# Patient Record
Sex: MALE | Race: WHITE | NOT HISPANIC OR LATINO | URBAN - METROPOLITAN AREA
[De-identification: names, ages, dates, MRNs, and addresses within clinical notes are randomized per-mention and may not be internally consistent; named-entity substitution may affect disease eponyms.]

---

## 2023-11-30 NOTE — PROGRESS NOTES
"Counseling:  The patient was counseled regarding diagnostic results, instructions for management, risk factor reductions, prognosis, patient and family education, impressions, risks and benefits of treatment options and importance of compliance with treatment.      Chief Complaint:   The patient presents today for 6-month followup of CAD, HTN and hyperlipidemia.       History Of Present Illness:    Gulshan Martinez is a 48 year old male patient who presents today for 6-month followup of CAD, HTN and hyperlipidemia. His PMH is significant for CAD with h/o STEMI s/p POBA to D2 and LOLI to mid LAD 06/24/2021, HTN, hyperlipidemia and tobacco abuse. Over the past 6 months, the patient states that he has done well from a cardiac standpoint. He denies any CP, chest discomfort or SOB. BP has been stable at home. The patient is compliant with his prescribed medications.       Last Recorded Vitals:  Vitals:    12/04/23 1220   BP: 142/88   Pulse: 90   Weight: 147 kg (323 lb 9.6 oz)   Height: 1.803 m (5' 11\")       Past Surgical History:  He has a past surgical history that includes Other surgical history (06/25/2021).      Social History:  He reports that he has been smoking cigarettes. He has a 30.00 pack-year smoking history. He has never used smokeless tobacco. He reports current alcohol use of about 2.0 standard drinks of alcohol per week. He reports that he does not use drugs.    Family History:  No family history on file.     Allergies:  Patient has no known allergies.    Outpatient Medications:  Current Outpatient Medications   Medication Instructions    aspirin 81 mg, oral, Daily    clopidogrel (PLAVIX) 75 mg, oral, Daily    lisinopril 20 mg, oral, Daily    metoprolol tartrate (LOPRESSOR) 25 mg, oral, 2 times daily    rosuvastatin (CRESTOR) 40 mg, oral, Daily       Review of Systems   All other systems reviewed and are negative.     Physical Exam:  Constitutional:       Appearance: Healthy appearance. Not in distress. "   Neck:      Vascular: No JVR. JVD normal.   Pulmonary:      Effort: Pulmonary effort is normal.      Breath sounds: Normal breath sounds. No wheezing. No rhonchi. No rales.   Chest:      Chest wall: Not tender to palpatation.   Cardiovascular:      PMI at left midclavicular line. Normal rate. Regular rhythm. Normal S1. Normal S2.       Murmurs: There is no murmur.      No gallop.  No click. No rub.   Pulses:     Intact distal pulses.   Edema:     Peripheral edema absent.   Abdominal:      General: Bowel sounds are normal.      Palpations: Abdomen is soft.      Tenderness: There is no abdominal tenderness.   Musculoskeletal: Normal range of motion.         General: No tenderness. Skin:     General: Skin is warm and dry.   Neurological:      General: No focal deficit present.      Mental Status: Alert and oriented to person, place and time.        Last Labs:  CBC -  Lab Results   Component Value Date    WBC 9.0 03/28/2022    HGB 16.8 03/28/2022    HCT 49.4 03/28/2022    MCV 87 03/28/2022     03/28/2022       CMP -  Lab Results   Component Value Date    CALCIUM 9.8 03/28/2022    PROT 6.7 03/28/2022    ALBUMIN 4.3 03/28/2022    AST 19 03/28/2022    ALT 39 03/28/2022    ALKPHOS 101 03/28/2022    BILITOT 0.7 03/28/2022       LIPID PANEL -   Lab Results   Component Value Date    CHOL 125 03/28/2022    TRIG 107 03/28/2022    HDL 39.3 (A) 03/28/2022    CHHDL 3.2 03/28/2022    LDLF 64 03/28/2022    VLDL 21 03/28/2022       RENAL FUNCTION PANEL -   Lab Results   Component Value Date    GLUCOSE 113 (H) 03/28/2022     03/28/2022    K 4.8 03/28/2022     03/28/2022    CO2 27 03/28/2022    ANIONGAP 11 03/28/2022    BUN 15 03/28/2022    CREATININE 0.97 03/28/2022    GFRMALE >90 03/28/2022    CALCIUM 9.8 03/28/2022    ALBUMIN 4.3 03/28/2022        Last Cardiology Tests:  03/28/2022 - TTE  1. The left ventricular systolic function is low normal with a 50-55% estimated ejection fraction.  2. Aortic valve stenosis is  not present.     06/24/2021 - TTE  1. The left ventricular systolic function is normal with a 60-65% estimated ejection fraction.  2. Moderately increased left ventricular septal thickness.     06/24/2021 - Cardiac Catheterization (LH) - PCI  1. Double vessel disease.  2. Culprit vessel(s): 2nd diagonal.  3. S/P Successful POBA to D2 with 2.25 mm NC balloon and 3.5 x 32 mm LOLI to mid LAD 70% bifurcational lesion BLU 3 flow and <10% residual stenosis with excellent results.  4. Severely elevated BP.  5. Normal LVEDP.  6. No evidence of AS.     Assessment/Plan   1) CAD s/p MI in past and PCI of LAD  On DAPT - ASA and Plavix 75 mg daily  On rosuvastatin 40 mg daily, lisinopril 20 mg daily, metoprolol tartrate 25 mg BID  Doing well - denies CP, chest discomfort or SOB  Check CBC, CMP, Lipid Panel  Followup with Loren Snyder NP, in 6 months    2) Hyperlipidemia  Goal LDL <70  Atorvastatin previously discontinued as patient felt it was causing urinary frequency and possible back pain   On rosuvastatin 40 mg daily  May need to consider PCSK9 inhibitors, but patient does not currently have insurance  Lipid panel 03/2022 with LDL of 64  Check lipid panel  Followup with Loren Snyder NP, in 6 months    3) HTN  Stable  On lisinopril 20 mg daily, metoprolol tartrate 25 mg BID   Followup with Loren Snyder NP, in 6 months     4) Smoking  Previously counselled about risks of smoking including worsening of CAD, HTN and Lung disease. D/w patient about options of quitting smoking including Wellbutrin, Nicotine Patch or gum and Chantix. Previously prescribed Chantix.      Scribe Attestation  By signing my name below, I, Jewell Steinberg   attest that this documentation has been prepared under the direction and in the presence of Ambrose Worthy MD.

## 2023-12-04 ENCOUNTER — OFFICE VISIT (OUTPATIENT)
Dept: CARDIOLOGY | Facility: CLINIC | Age: 48
End: 2023-12-04
Payer: COMMERCIAL

## 2023-12-04 VITALS
SYSTOLIC BLOOD PRESSURE: 142 MMHG | BODY MASS INDEX: 44.1 KG/M2 | HEART RATE: 90 BPM | WEIGHT: 315 LBS | HEIGHT: 71 IN | DIASTOLIC BLOOD PRESSURE: 88 MMHG

## 2023-12-04 DIAGNOSIS — I25.10 CORONARY ARTERY DISEASE INVOLVING NATIVE CORONARY ARTERY OF NATIVE HEART WITHOUT ANGINA PECTORIS: Primary | ICD-10-CM

## 2023-12-04 PROCEDURE — 99213 OFFICE O/P EST LOW 20 MIN: CPT | Performed by: INTERNAL MEDICINE

## 2023-12-04 PROCEDURE — 93000 ELECTROCARDIOGRAM COMPLETE: CPT | Performed by: INTERNAL MEDICINE

## 2023-12-04 RX ORDER — METOPROLOL TARTRATE 25 MG/1
25 TABLET, FILM COATED ORAL 2 TIMES DAILY
Qty: 180 TABLET | Refills: 3 | Status: SHIPPED | OUTPATIENT
Start: 2023-12-04 | End: 2024-12-03

## 2023-12-04 RX ORDER — LISINOPRIL 20 MG/1
20 TABLET ORAL DAILY
Qty: 30 TABLET | Refills: 3 | Status: SHIPPED | OUTPATIENT
Start: 2023-12-04 | End: 2024-04-10

## 2023-12-04 RX ORDER — ROSUVASTATIN CALCIUM 40 MG/1
40 TABLET, COATED ORAL DAILY
COMMUNITY
Start: 2023-08-22 | End: 2023-12-04 | Stop reason: SDUPTHER

## 2023-12-04 RX ORDER — CLOPIDOGREL BISULFATE 75 MG/1
75 TABLET ORAL DAILY
Qty: 90 TABLET | Refills: 3 | Status: SHIPPED | OUTPATIENT
Start: 2023-12-04 | End: 2024-12-03

## 2023-12-04 RX ORDER — LISINOPRIL 20 MG/1
20 TABLET ORAL DAILY
COMMUNITY
End: 2023-12-04 | Stop reason: SDUPTHER

## 2023-12-04 RX ORDER — ASPIRIN 81 MG/1
1 TABLET ORAL DAILY
COMMUNITY
Start: 2021-06-25 | End: 2023-12-04 | Stop reason: SDUPTHER

## 2023-12-04 RX ORDER — METOPROLOL TARTRATE 25 MG/1
25 TABLET, FILM COATED ORAL 2 TIMES DAILY
COMMUNITY
End: 2023-12-04 | Stop reason: SDUPTHER

## 2023-12-04 RX ORDER — CLOPIDOGREL BISULFATE 75 MG/1
75 TABLET ORAL DAILY
COMMUNITY
End: 2023-12-04 | Stop reason: SDUPTHER

## 2023-12-04 RX ORDER — ASPIRIN 81 MG/1
81 TABLET ORAL DAILY
Qty: 90 TABLET | Refills: 3 | Status: SHIPPED | OUTPATIENT
Start: 2023-12-04 | End: 2024-12-03

## 2023-12-04 RX ORDER — ROSUVASTATIN CALCIUM 40 MG/1
40 TABLET, COATED ORAL DAILY
Qty: 90 TABLET | Refills: 3 | Status: SHIPPED | OUTPATIENT
Start: 2023-12-04 | End: 2024-12-03

## 2023-12-04 NOTE — PATIENT INSTRUCTIONS
Continue all current medications as prescribed. Refills have been sent to your pharmacy as per your request.   Please have blood work drawn at your earliest convenience. You will be notified of the results once they become available.  Followup with Loren Snyder NP, in 6 months.      If you have any questions or cardiac concerns, please call our office at [phone].

## 2023-12-04 NOTE — Clinical Note
December 4, 2023       No Recipients    Patient: Gulshan Martinez   YOB: 1975   Date of Visit: 12/4/2023       Dear Dr. Billy Recipients:    Thank you for referring Gulshan Martinez to me for evaluation. Below are my notes for this consultation.  If you have questions, please do not hesitate to call me. I look forward to following your patient along with you.       Sincerely,     Ambrose Worthy MD      CC:   No Recipients  ______________________________________________________________________________________    Counseling:  The patient was counseled regarding diagnostic results, instructions for management, risk factor reductions, prognosis, patient and family education, impressions, risks and benefits of treatment options and importance of compliance with treatment.      Chief Complaint:   The patient presents today for 6-month followup of CAD, HTN and hyperlipidemia.       History Of Present Illness:    Gulshan Martinez is a 48 year old male patient who presents today for 6-month followup of CAD, HTN and hyperlipidemia. His PMH is significant for CAD with h/o STEMI s/p POBA to D2 and LOLI to mid LAD 06/24/2021, HTN, hyperlipidemia and tobacco abuse      Last Recorded Vitals:  There were no vitals filed for this visit.    Past Surgical History:  He has a past surgical history that includes Other surgical history (06/25/2021).      Social History:  He has no history on file for tobacco use, alcohol use, and drug use.    Family History:  No family history on file.     Allergies:  Patient has no allergy information on record.    Outpatient Medications:  No current outpatient medications    Review of Systems   All other systems reviewed and are negative.     Physical Exam:  Constitutional:       Appearance: Healthy appearance. Not in distress.   Neck:      Vascular: No JVR. JVD normal.   Pulmonary:      Effort: Pulmonary effort is normal.      Breath sounds: Normal breath sounds. No wheezing. No rhonchi. No  rales.   Chest:      Chest wall: Not tender to palpatation.   Cardiovascular:      PMI at left midclavicular line. Normal rate. Regular rhythm. Normal S1. Normal S2.       Murmurs: There is no murmur.      No gallop.  No click. No rub.   Pulses:     Intact distal pulses.   Edema:     Peripheral edema absent.   Abdominal:      General: Bowel sounds are normal.      Palpations: Abdomen is soft.      Tenderness: There is no abdominal tenderness.   Musculoskeletal: Normal range of motion.         General: No tenderness. Skin:     General: Skin is warm and dry.   Neurological:      General: No focal deficit present.      Mental Status: Alert and oriented to person, place and time.        Last Labs:  CBC -  Lab Results   Component Value Date    WBC 9.0 03/28/2022    HGB 16.8 03/28/2022    HCT 49.4 03/28/2022    MCV 87 03/28/2022     03/28/2022       CMP -  Lab Results   Component Value Date    CALCIUM 9.8 03/28/2022    PROT 6.7 03/28/2022    ALBUMIN 4.3 03/28/2022    AST 19 03/28/2022    ALT 39 03/28/2022    ALKPHOS 101 03/28/2022    BILITOT 0.7 03/28/2022       LIPID PANEL -   Lab Results   Component Value Date    CHOL 125 03/28/2022    TRIG 107 03/28/2022    HDL 39.3 (A) 03/28/2022    CHHDL 3.2 03/28/2022    LDLF 64 03/28/2022    VLDL 21 03/28/2022       RENAL FUNCTION PANEL -   Lab Results   Component Value Date    GLUCOSE 113 (H) 03/28/2022     03/28/2022    K 4.8 03/28/2022     03/28/2022    CO2 27 03/28/2022    ANIONGAP 11 03/28/2022    BUN 15 03/28/2022    CREATININE 0.97 03/28/2022    GFRMALE >90 03/28/2022    CALCIUM 9.8 03/28/2022    ALBUMIN 4.3 03/28/2022        Last Cardiology Tests:  03/28/2022 - TTE  1. The left ventricular systolic function is low normal with a 50-55% estimated ejection fraction.  2. Aortic valve stenosis is not present.     06/24/2021 - TTE  1. The left ventricular systolic function is normal with a 60-65% estimated ejection fraction.  2. Moderately increased left  ventricular septal thickness.     06/24/2021 - Cardiac Catheterization (LH) - PCI  1. Double vessel disease.  2. Culprit vessel(s): 2nd diagonal.  3. S/P Successful POBA to D2 with 2.25 mm NC balloon and 3.5 x 32 mm LOLI to mid LAD 70% bifurcational lesion BLU 3 flow and <10% residual stenosis with excellent results.  4. Severely elevated BP.  5. Normal LVEDP.  6. No evidence of AS.     {Lab/Diag/Rad Review:16523}    Assessment/Plan  1) CAD s/p MI in past and PCI of LAD  DAPT and statins  Labs stable    2) Hyperlipidemia  Goal LDL <70  Atorvastatin previously discontinued as patient felt it was causing urinary frequency and possible back pain   On rosuvastatin 40 mg daily  May need to consider PCSK9 inhibitors, but patient does not currently have insurance    3) HTN  Better controlled on Lisinopril and metoprolol     4) Smoking  Previously counselled about risks of smoking including worsening of CAD, HTN and Lung disease. D/w patient about options of quitting smoking including Wellbutrin, Nicotine Patch or gum and Chantix. Previously prescribed Chantix.      Scribe Attestation  By signing my name below, I, Jewell Steinberg   attest that this documentation has been prepared under the direction and in the presence of Ambrose Worthy MD.

## 2024-04-09 DIAGNOSIS — I25.10 CORONARY ARTERY DISEASE INVOLVING NATIVE CORONARY ARTERY OF NATIVE HEART WITHOUT ANGINA PECTORIS: ICD-10-CM

## 2024-04-09 NOTE — TELEPHONE ENCOUNTER
Received electronic refill request for Lisinopril 20 mg    Last Appointment: 12/4/23 with Dr Worthy   Next Appointment: 6/5/24 with Loren Snyder  Last Labs: 12/4/23 labs ordered, but not drawn. I called and LVM for patient to remind him to have this drawn.  Last Refilled: 12/4/23 30 days 3 refills

## 2024-04-10 RX ORDER — LISINOPRIL 20 MG/1
20 TABLET ORAL DAILY
Qty: 30 TABLET | Refills: 0 | Status: SHIPPED | OUTPATIENT
Start: 2024-04-10 | End: 2024-06-04

## 2024-05-02 DIAGNOSIS — I25.10 CORONARY ARTERY DISEASE INVOLVING NATIVE CORONARY ARTERY OF NATIVE HEART WITHOUT ANGINA PECTORIS: ICD-10-CM

## 2024-05-02 RX ORDER — METOPROLOL TARTRATE 25 MG/1
25 TABLET, FILM COATED ORAL 2 TIMES DAILY
Qty: 180 TABLET | Refills: 0 | OUTPATIENT
Start: 2024-05-02 | End: 2024-07-31

## 2024-05-02 NOTE — TELEPHONE ENCOUNTER
----- Message from Nikki Farrar sent at 5/2/2024 10:03 AM EDT -----  Regarding: Med refill  Patient needs metoprolol sent to [facility] in [place].

## 2024-05-02 NOTE — TELEPHONE ENCOUNTER
Dr. Ambrose Worthy sent 90 days with 3 refills Dec 2023.  I called the pharmacy and confirmed he still has 2 more refills left.

## 2024-05-07 ENCOUNTER — TELEPHONE (OUTPATIENT)
Dept: CARDIOLOGY | Facility: CLINIC | Age: 49
End: 2024-05-07

## 2024-05-07 NOTE — TELEPHONE ENCOUNTER
I called patient and let him know that a script was sent to his pharmacy in December for for 90 days with three refills so he should have refills.    ----- Message from Nikki Farrar sent at 5/7/2024  2:53 PM EDT -----  Regarding: Med refill  Needs metoprolol sent to  [facility].  Pt called last week and he's out.

## 2024-05-22 DIAGNOSIS — I25.10 CORONARY ARTERY DISEASE INVOLVING NATIVE CORONARY ARTERY OF NATIVE HEART WITHOUT ANGINA PECTORIS: ICD-10-CM

## 2024-06-04 RX ORDER — LISINOPRIL 20 MG/1
20 TABLET ORAL DAILY
Qty: 30 TABLET | Refills: 0 | Status: SHIPPED | OUTPATIENT
Start: 2024-06-04 | End: 2024-07-04

## 2024-06-04 NOTE — TELEPHONE ENCOUNTER
6/4/24  1214  Called and informed patient to have fasting labs done for medication renewal.    Patient verbalized understanding of instructions.    30 day supply sent for approval.

## 2024-06-05 ENCOUNTER — APPOINTMENT (OUTPATIENT)
Dept: CARDIOLOGY | Facility: CLINIC | Age: 49
End: 2024-06-05
Payer: COMMERCIAL

## 2024-06-17 PROBLEM — R20.0 FACIAL NUMBNESS: Status: ACTIVE | Noted: 2024-06-17

## 2024-06-17 PROBLEM — R06.02 SHORTNESS OF BREATH ON EXERTION: Status: ACTIVE | Noted: 2024-06-17

## 2024-06-17 PROBLEM — I10 BENIGN ESSENTIAL HYPERTENSION: Status: ACTIVE | Noted: 2024-06-17

## 2024-06-17 PROBLEM — R94.31 ABNORMAL EKG: Status: ACTIVE | Noted: 2024-06-17

## 2024-06-17 PROBLEM — E78.5 HYPERLIPIDEMIA: Status: ACTIVE | Noted: 2024-06-17

## 2024-06-17 PROBLEM — Z98.61 S/P PTCA (PERCUTANEOUS TRANSLUMINAL CORONARY ANGIOPLASTY): Status: ACTIVE | Noted: 2024-06-17

## 2024-06-24 ASSESSMENT — ENCOUNTER SYMPTOMS
ORTHOPNEA: 0
ALTERED MENTAL STATUS: 0
FEVER: 0
SYNCOPE: 0
COUGH: 0
SHORTNESS OF BREATH: 0
HEMATURIA: 0
IRREGULAR HEARTBEAT: 0
NEAR-SYNCOPE: 0
CHILLS: 0
PALPITATIONS: 0
VOMITING: 0
DYSPNEA ON EXERTION: 1
WHEEZING: 0
NAUSEA: 0
HEMATOCHEZIA: 0

## 2024-06-24 NOTE — PATIENT INSTRUCTIONS
Recommend Mediterranean style of eating  Continue current medications  Check stress test and echocardiogram  Go to ER for any chest pain or pressure that lasts longer than 5 minutes.  Follow-up with Dr. Worthy in 1 month  If you have any questions or cardiac concerns, please call our office at [phone].

## 2024-06-24 NOTE — PROGRESS NOTES
Chief Complaint/Reason for Visit:  Follow-up (3 month) 3 month cardiovascular follow up    History Of Present Illness:    Gulshan Martinez is a 48 y.o. male that presents to the office for 3 month follow up.    Taking medications as prescribed.     PMH is significant for CAD with h/o STEMI s/p POBA to D2 and LOLI to mid LAD 06/24/2021, HTN, hyperlipidemia and nicotine dependence. Current smoker.     Chronic ACOSTA that is at baseline. 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 Main concern today is that he thinks atorvastatin is causing increased urination and possibly back pain (comes and goes). He did have a back injury in 2008 when he fell 10-15 feet from a ladder. He admits to missing his atorvastatin dose at times because he forget at HS. When he didn't take the atorvastatin he had less frequent urination.     EKG personally reviewed today showed SR with HR 92 bpm.  This will go to the cardiologist for final review.     Past Medical History:  He has a past medical history of Non-ST elevation (NSTEMI) myocardial infarction (Multi).    Past Surgical History:  He has a past surgical history that includes Other surgical history (06/25/2021).      Social History:  He reports that he has been smoking cigarettes. He has a 30 pack-year smoking history. He has never used smokeless tobacco. He reports current alcohol use of about 2.0 standard drinks of alcohol per week. He reports that he does not use drugs.    Family History:  No family history on file.     Allergies:  Patient has no known allergies.    Review of Systems   Constitutional: Negative for chills and fever.   Cardiovascular:  Positive for chest pain (chest pressure) and dyspnea on exertion (chronic). Negative for irregular heartbeat, leg swelling, near-syncope, orthopnea, palpitations and syncope.   Respiratory:  Negative for cough, shortness of breath and wheezing.    Musculoskeletal:  Positive for back pain (chronic neck  and back pain).   Gastrointestinal:  Negative for hematochezia, melena, nausea and vomiting.   Genitourinary:  Negative for hematuria.   Psychiatric/Behavioral:  Negative for altered mental status.        Objective      Vitals reviewed.   Constitutional:       Appearance: Healthy appearance.   Pulmonary:      Effort: Pulmonary effort is normal.      Breath sounds: Normal breath sounds.   Cardiovascular:      PMI at left midclavicular line. Normal rate. Regular rhythm. S1 with normal intensity. S2 with normal intensity.       Murmurs: There is no murmur.   Edema:     Peripheral edema absent.   Abdominal:      General: Bowel sounds are normal.   Skin:     General: Skin is warm and dry.   Psychiatric:         Attention and Perception: Attention normal.         Mood and Affect: Mood normal.         Behavior: Behavior is cooperative.         Current Outpatient Medications   Medication Instructions    aspirin 81 mg, oral, Daily    clopidogrel (PLAVIX) 75 mg, oral, Daily    lisinopril 20 mg, oral, Daily    metoprolol tartrate (LOPRESSOR) 25 mg, oral, 2 times daily    rosuvastatin (CRESTOR) 40 mg, oral, Daily        Last Labs:  CBC -  Lab Results   Component Value Date    WBC 7.2 06/26/2024    HGB 17.2 06/26/2024    HCT 50.1 06/26/2024    MCV 86 06/26/2024     06/26/2024       RENAL FUNCTION PANEL -   Lab Results   Component Value Date    GLUCOSE 127 (H) 06/26/2024     06/26/2024    K 4.7 06/26/2024     06/26/2024    CO2 21 06/26/2024    ANIONGAP 15 06/26/2024    BUN 11 06/26/2024    CREATININE 0.88 06/26/2024    GFRMALE >90 03/28/2022    CALCIUM 9.3 06/26/2024    ALBUMIN 4.3 06/26/2024        CMP -  Lab Results   Component Value Date    CALCIUM 9.3 06/26/2024    PROT 6.8 06/26/2024    ALBUMIN 4.3 06/26/2024    AST 23 06/26/2024    ALT 33 06/26/2024    ALKPHOS 86 06/26/2024    BILITOT 0.5 06/26/2024       LIPID PANEL -   Lab Results   Component Value Date    CHOL 114 06/26/2024    TRIG 92 06/26/2024     "HDL 38.5 06/26/2024    CHHDL 3.0 06/26/2024    LDLF 64 03/28/2022    VLDL 18 06/26/2024    NHDL 76 06/26/2024     Lab Results   Component Value Date    LDLCALC 57 06/26/2024       No results found for: \"BNP\", \"HGBA1C\"    No results found for: \"TSH\"    No results found for this or any previous visit.     Last Cardiology Tests:    TTE 3/28/2022 showed LV systolic function is low normal with an EF of 50 to 55%.     LHC 6/24/2021 showed 100% stenosis of the ostial second diagonal and 70% stenosis of the mid LAD. Status post successful POBA of D2 and 3.5 x 22 mm LOLI to the mid LAD.    Visit Vitals  /86   Pulse 90   Wt 144 kg (317 lb)   BMI 44.21 kg/m²   Smoking Status Every Day   BSA 2.69 m²       Assessment/Plan   The primary encounter diagnosis was Atherosclerosis of native coronary artery of native heart without angina pectoris. Diagnoses of Hyperlipidemia, unspecified hyperlipidemia type, Benign essential hypertension, and Coronary artery disease involving native coronary artery of native heart without angina pectoris were also pertinent to this visit.    1. CAD s/p PCI LAD in June 2021  Continue DAPT - aspirin 81 mg daily and clopidogrel 75 mg daily  Continue rosuvastatin 40 mg daily and Metoprolol tartrate 25 mg BID  TTE March 2022 with LVEF 50-55%   Recommend complete smoking cessation  Having chest pressure at times with exertion such as dragging brush and other times at rest.  Lasts a few minutes.  This is occurring 1-2 times/week.  Check exercise stress test and echocardiogram    2. Dyslipidemia  Goal LDL <70.  Currently at goal.  Continue rosuvastatin 40 mg daily.   Atorvastatin stopped previously as patient stated it was causing back pain and increased urination     3. HTN   Stable  Continue current antihypertensives: lisinopril 20 mg daily and metoprolol tartrate 25 mg BID     Loren Snyder, APRN-CNP   "

## 2024-06-26 ENCOUNTER — LAB (OUTPATIENT)
Dept: LAB | Facility: LAB | Age: 49
End: 2024-06-26

## 2024-06-26 ENCOUNTER — APPOINTMENT (OUTPATIENT)
Dept: CARDIOLOGY | Facility: CLINIC | Age: 49
End: 2024-06-26
Payer: COMMERCIAL

## 2024-06-26 VITALS
DIASTOLIC BLOOD PRESSURE: 86 MMHG | WEIGHT: 315 LBS | BODY MASS INDEX: 44.21 KG/M2 | HEART RATE: 90 BPM | SYSTOLIC BLOOD PRESSURE: 132 MMHG

## 2024-06-26 DIAGNOSIS — I25.10 CORONARY ARTERY DISEASE INVOLVING NATIVE CORONARY ARTERY OF NATIVE HEART WITHOUT ANGINA PECTORIS: ICD-10-CM

## 2024-06-26 DIAGNOSIS — R07.89 CHEST PRESSURE: ICD-10-CM

## 2024-06-26 DIAGNOSIS — I10 BENIGN ESSENTIAL HYPERTENSION: ICD-10-CM

## 2024-06-26 DIAGNOSIS — I25.10 ATHEROSCLEROSIS OF NATIVE CORONARY ARTERY OF NATIVE HEART WITHOUT ANGINA PECTORIS: Primary | ICD-10-CM

## 2024-06-26 DIAGNOSIS — E78.5 HYPERLIPIDEMIA, UNSPECIFIED HYPERLIPIDEMIA TYPE: ICD-10-CM

## 2024-06-26 LAB
ALBUMIN SERPL BCP-MCNC: 4.3 G/DL (ref 3.4–5)
ALP SERPL-CCNC: 86 U/L (ref 33–120)
ALT SERPL W P-5'-P-CCNC: 33 U/L (ref 10–52)
ANION GAP SERPL CALC-SCNC: 15 MMOL/L (ref 10–20)
AST SERPL W P-5'-P-CCNC: 23 U/L (ref 9–39)
BASOPHILS # BLD AUTO: 0.07 X10*3/UL (ref 0–0.1)
BASOPHILS NFR BLD AUTO: 1 %
BILIRUB SERPL-MCNC: 0.5 MG/DL (ref 0–1.2)
BUN SERPL-MCNC: 11 MG/DL (ref 6–23)
CALCIUM SERPL-MCNC: 9.3 MG/DL (ref 8.6–10.3)
CHLORIDE SERPL-SCNC: 107 MMOL/L (ref 98–107)
CHOLEST SERPL-MCNC: 114 MG/DL (ref 0–199)
CHOLESTEROL/HDL RATIO: 3
CO2 SERPL-SCNC: 21 MMOL/L (ref 21–32)
CREAT SERPL-MCNC: 0.88 MG/DL (ref 0.5–1.3)
EGFRCR SERPLBLD CKD-EPI 2021: >90 ML/MIN/1.73M*2
EOSINOPHIL # BLD AUTO: 0.35 X10*3/UL (ref 0–0.7)
EOSINOPHIL NFR BLD AUTO: 4.9 %
ERYTHROCYTE [DISTWIDTH] IN BLOOD BY AUTOMATED COUNT: 13.1 % (ref 11.5–14.5)
GLUCOSE SERPL-MCNC: 127 MG/DL (ref 74–99)
HCT VFR BLD AUTO: 50.1 % (ref 41–52)
HDLC SERPL-MCNC: 38.5 MG/DL
HGB BLD-MCNC: 17.2 G/DL (ref 13.5–17.5)
IMM GRANULOCYTES # BLD AUTO: 0.02 X10*3/UL (ref 0–0.7)
IMM GRANULOCYTES NFR BLD AUTO: 0.3 % (ref 0–0.9)
LDLC SERPL CALC-MCNC: 57 MG/DL
LYMPHOCYTES # BLD AUTO: 1.78 X10*3/UL (ref 1.2–4.8)
LYMPHOCYTES NFR BLD AUTO: 24.7 %
MCH RBC QN AUTO: 29.5 PG (ref 26–34)
MCHC RBC AUTO-ENTMCNC: 34.3 G/DL (ref 32–36)
MCV RBC AUTO: 86 FL (ref 80–100)
MONOCYTES # BLD AUTO: 0.55 X10*3/UL (ref 0.1–1)
MONOCYTES NFR BLD AUTO: 7.6 %
NEUTROPHILS # BLD AUTO: 4.43 X10*3/UL (ref 1.2–7.7)
NEUTROPHILS NFR BLD AUTO: 61.5 %
NON HDL CHOLESTEROL: 76 MG/DL (ref 0–149)
NRBC BLD-RTO: 0 /100 WBCS (ref 0–0)
PLATELET # BLD AUTO: 312 X10*3/UL (ref 150–450)
POTASSIUM SERPL-SCNC: 4.7 MMOL/L (ref 3.5–5.3)
PROT SERPL-MCNC: 6.8 G/DL (ref 6.4–8.2)
RBC # BLD AUTO: 5.84 X10*6/UL (ref 4.5–5.9)
SODIUM SERPL-SCNC: 138 MMOL/L (ref 136–145)
TRIGL SERPL-MCNC: 92 MG/DL (ref 0–149)
VLDL: 18 MG/DL (ref 0–40)
WBC # BLD AUTO: 7.2 X10*3/UL (ref 4.4–11.3)

## 2024-06-26 PROCEDURE — 99214 OFFICE O/P EST MOD 30 MIN: CPT | Performed by: NURSE PRACTITIONER

## 2024-06-26 PROCEDURE — 85025 COMPLETE CBC W/AUTO DIFF WBC: CPT

## 2024-06-26 PROCEDURE — 93000 ELECTROCARDIOGRAM COMPLETE: CPT | Performed by: INTERNAL MEDICINE

## 2024-06-26 PROCEDURE — 80061 LIPID PANEL: CPT

## 2024-06-26 PROCEDURE — 80053 COMPREHEN METABOLIC PANEL: CPT

## 2024-06-26 PROCEDURE — 36415 COLL VENOUS BLD VENIPUNCTURE: CPT

## 2024-06-26 PROCEDURE — 3075F SYST BP GE 130 - 139MM HG: CPT | Performed by: NURSE PRACTITIONER

## 2024-06-26 PROCEDURE — 3079F DIAST BP 80-89 MM HG: CPT | Performed by: NURSE PRACTITIONER

## 2024-06-26 RX ORDER — LISINOPRIL 20 MG/1
20 TABLET ORAL DAILY
Qty: 90 TABLET | Refills: 2 | Status: SHIPPED | OUTPATIENT
Start: 2024-06-26 | End: 2025-03-23

## 2024-06-26 ASSESSMENT — ENCOUNTER SYMPTOMS: BACK PAIN: 1

## 2024-07-12 RX ORDER — LISINOPRIL 20 MG/1
20 TABLET ORAL DAILY
Qty: 30 TABLET | Refills: 0 | OUTPATIENT
Start: 2024-07-12 | End: 2024-08-11

## 2024-07-19 ENCOUNTER — APPOINTMENT (OUTPATIENT)
Dept: CARDIOLOGY | Facility: HOSPITAL | Age: 49
End: 2024-07-19

## 2024-07-19 ENCOUNTER — APPOINTMENT (OUTPATIENT)
Dept: RADIOLOGY | Facility: HOSPITAL | Age: 49
End: 2024-07-19

## 2024-08-02 ENCOUNTER — APPOINTMENT (OUTPATIENT)
Dept: CARDIOLOGY | Facility: CLINIC | Age: 49
End: 2024-08-02

## 2024-08-02 VITALS
HEIGHT: 71 IN | HEART RATE: 73 BPM | WEIGHT: 315 LBS | BODY MASS INDEX: 44.1 KG/M2 | SYSTOLIC BLOOD PRESSURE: 152 MMHG | DIASTOLIC BLOOD PRESSURE: 92 MMHG

## 2024-08-02 DIAGNOSIS — I10 BENIGN ESSENTIAL HYPERTENSION: ICD-10-CM

## 2024-08-02 DIAGNOSIS — I25.10 CORONARY ARTERY DISEASE INVOLVING NATIVE CORONARY ARTERY OF NATIVE HEART WITHOUT ANGINA PECTORIS: ICD-10-CM

## 2024-08-02 DIAGNOSIS — E78.5 HYPERLIPIDEMIA, UNSPECIFIED HYPERLIPIDEMIA TYPE: ICD-10-CM

## 2024-08-02 DIAGNOSIS — I25.10 ATHEROSCLEROSIS OF NATIVE CORONARY ARTERY OF NATIVE HEART WITHOUT ANGINA PECTORIS: ICD-10-CM

## 2024-08-02 RX ORDER — LISINOPRIL 40 MG/1
40 TABLET ORAL DAILY
Qty: 90 TABLET | Refills: 2 | Status: ON HOLD | OUTPATIENT
Start: 2024-08-02 | End: 2025-04-29

## 2024-08-02 NOTE — PATIENT INSTRUCTIONS
For your blood pressure, please increase lisinopril to 40 mg daily. If you have any of the 20 mg tablets left, you can take 2 tablets once daily until they are finished. A prescription for the new dose has been sent to your pharmacy.  Continue all other medications as prescribed.  Please contact our office once you have obtained insurance so we can schedule a heart catheterization.  If you develop recurrent chest pain, please present to the emergency room.  Followup with Loren Snyder NP, in 3 months.    If you have any questions or cardiac concerns, please call our office at [phone].

## 2024-08-02 NOTE — PROGRESS NOTES
"Counseling:  The patient was counseled regarding diagnostic results, instructions for management, risk factor reductions, prognosis, patient and family education, impressions, risks and benefits of treatment options and importance of compliance with treatment.      Chief Complaint:   The patient presents today for 6-week followup of chest pressure.     History Of Present Illness:    Gulshan Martinez is a 48 year old male patient who presents today for 6-week followup of chest pressure. His PMH is significant for CAD with h/o STEMI s/p POBA to D2 and LOLI to mid LAD 06/24/2021, HTN, hyperlipidemia and tobacco abuse. On 07/29/2024, the patient presented to the ED in [place] with chest pain with negative workup. Unfortunately, the patient does not have insurance, so the previously ordered stress test and echocardiogram have not been performed. He states that he feels improved since being seen in the ED. BP is elevated today. The patient is compliant with his prescribed medications.     Last Recorded Vitals:  Vitals:    08/02/24 0954   BP: (!) 152/92   Pulse: 73   Weight: 145 kg (320 lb)   Height: 1.803 m (5' 11\")       Past Surgical History:  He has a past surgical history that includes Other surgical history (06/25/2021).      Social History:  He reports that he has been smoking cigarettes. He has a 30 pack-year smoking history. He has never used smokeless tobacco. He reports current alcohol use of about 2.0 standard drinks of alcohol per week. He reports that he does not use drugs.    Family History:  No family history on file.     Allergies:  Patient has no known allergies.    Outpatient Medications:  Current Outpatient Medications   Medication Instructions    aspirin 81 mg, oral, Daily    clopidogrel (PLAVIX) 75 mg, oral, Daily    lisinopril 20 mg, oral, Daily    metoprolol tartrate (LOPRESSOR) 25 mg, oral, 2 times daily    rosuvastatin (CRESTOR) 40 mg, oral, Daily       Review of Systems   Cardiovascular:  " Positive for chest pain.   All other systems reviewed and are negative.     Physical Exam:  Constitutional:       Appearance: Healthy appearance. Not in distress.   Neck:      Vascular: No JVR. JVD normal.   Pulmonary:      Effort: Pulmonary effort is normal.      Breath sounds: Normal breath sounds. No wheezing. No rhonchi. No rales.   Chest:      Chest wall: Not tender to palpatation.   Cardiovascular:      PMI at left midclavicular line. Normal rate. Regular rhythm. Normal S1. Normal S2.       Murmurs: There is no murmur.      No gallop.  No click. No rub.   Pulses:     Intact distal pulses.   Edema:     Peripheral edema absent.   Abdominal:      General: Bowel sounds are normal.      Palpations: Abdomen is soft.      Tenderness: There is no abdominal tenderness.   Musculoskeletal: Normal range of motion.         General: No tenderness. Skin:     General: Skin is warm and dry.   Neurological:      General: No focal deficit present.      Mental Status: Alert and oriented to person, place and time.        Last Labs:  CBC -  Lab Results   Component Value Date    WBC 7.2 06/26/2024    HGB 17.2 06/26/2024    HCT 50.1 06/26/2024    MCV 86 06/26/2024     06/26/2024       CMP -  Lab Results   Component Value Date    CALCIUM 9.3 06/26/2024    PROT 6.8 06/26/2024    ALBUMIN 4.3 06/26/2024    AST 23 06/26/2024    ALT 33 06/26/2024    ALKPHOS 86 06/26/2024    BILITOT 0.5 06/26/2024       LIPID PANEL -   Lab Results   Component Value Date    CHOL 114 06/26/2024    TRIG 92 06/26/2024    HDL 38.5 06/26/2024    CHHDL 3.0 06/26/2024    LDLF 64 03/28/2022    VLDL 18 06/26/2024    NHDL 76 06/26/2024       RENAL FUNCTION PANEL -   Lab Results   Component Value Date    GLUCOSE 127 (H) 06/26/2024     06/26/2024    K 4.7 06/26/2024     06/26/2024    CO2 21 06/26/2024    ANIONGAP 15 06/26/2024    BUN 11 06/26/2024    CREATININE 0.88 06/26/2024    GFRMALE >90 03/28/2022    CALCIUM 9.3 06/26/2024    ALBUMIN 4.3  06/26/2024        Last Cardiology Tests:  03/28/2022 - TTE  1. The left ventricular systolic function is low normal with a 50-55% estimated ejection fraction.  2. Aortic valve stenosis is not present.     06/24/2021 - TTE  1. The left ventricular systolic function is normal with a 60-65% estimated ejection fraction.  2. Moderately increased left ventricular septal thickness.     06/24/2021 - Cardiac Catheterization (LH) - PCI  1. Double vessel disease.  2. Culprit vessel(s): 2nd diagonal.  3. S/P Successful POBA to D2 with 2.25 mm NC balloon and 3.5 x 32 mm LOLI to mid LAD 70% bifurcational lesion BLU 3 flow and <10% residual stenosis with excellent results.  4. Severely elevated BP.  5. Normal LVEDP.  6. No evidence of AS.     Assessment/Plan   1) CAD s/p MI in past and PCI of LAD  On DAPT - ASA and Plavix 75 mg daily  On rosuvastatin 40 mg daily, lisinopril 20 mg daily, metoprolol tartrate 25 mg BID  Seen by Loren Snyder NP, 06/26/2024 - reported chest pressure, stress and echo ordered  Stress and echo not performed - patient does not have insurance   ED evaluation [place] 07/29/2024 with chest pain/pressure/heaviness - was performing exertion at the time, negative ischemic workup.   Discussed admission to hospital for [facility] - patient declines as he wishes to wait until he obtains insurance  Advised patient to present to ED with any recurrent chest pain  Patient to call our office when he obtains insurance and to schedule [facility]  Followup with Loren Snyder NP, in 3 months      2) Hyperlipidemia  Goal LDL <70  Atorvastatin previously discontinued as patient felt it was causing urinary frequency and possible back pain   On rosuvastatin 40 mg daily  May need to consider PCSK9 inhibitors, but patient does not currently have insurance  Lipid panel 06/26/2024 with LDL of 57; at goal  Continue current medical Rx   Followup with Loren Snyder NP, in 3 months      3) HTN  On lisinopril 20 mg daily, metoprolol tartrate  25 mg BID   Elevated  Increase lisinopril to 40 mg daily   Continue metoprolol as prescribed  Followup with Loren Snyder NP, in 3 months       4) Smoking  Previously counselled about risks of smoking including worsening of CAD, HTN and Lung disease. D/w patient about options of quitting smoking including Wellbutrin, Nicotine Patch or gum and Chantix. Previously prescribed Chantix.      Scribe Attestation  By signing my name below, I, Jewell Steinberg   attest that this documentation has been prepared under the direction and in the presence of Ambrose Worthy MD.

## 2024-08-04 ENCOUNTER — APPOINTMENT (OUTPATIENT)
Dept: RADIOLOGY | Facility: HOSPITAL | Age: 49
End: 2024-08-04

## 2024-08-04 ENCOUNTER — APPOINTMENT (OUTPATIENT)
Dept: CARDIOLOGY | Facility: HOSPITAL | Age: 49
End: 2024-08-04

## 2024-08-04 ENCOUNTER — HOSPITAL ENCOUNTER (OUTPATIENT)
Facility: HOSPITAL | Age: 49
Setting detail: OBSERVATION
End: 2024-08-04
Attending: EMERGENCY MEDICINE | Admitting: FAMILY MEDICINE

## 2024-08-04 VITALS
RESPIRATION RATE: 18 BRPM | WEIGHT: 315 LBS | HEART RATE: 68 BPM | OXYGEN SATURATION: 98 % | BODY MASS INDEX: 44.1 KG/M2 | HEIGHT: 71 IN | SYSTOLIC BLOOD PRESSURE: 111 MMHG | DIASTOLIC BLOOD PRESSURE: 70 MMHG | TEMPERATURE: 97.5 F

## 2024-08-04 DIAGNOSIS — I20.0 UNSTABLE ANGINA (MULTI): ICD-10-CM

## 2024-08-04 DIAGNOSIS — I25.10 ATHEROSCLEROSIS OF NATIVE CORONARY ARTERY OF NATIVE HEART WITHOUT ANGINA PECTORIS: ICD-10-CM

## 2024-08-04 DIAGNOSIS — Z98.61 S/P PTCA (PERCUTANEOUS TRANSLUMINAL CORONARY ANGIOPLASTY): ICD-10-CM

## 2024-08-04 DIAGNOSIS — R07.9 CHEST PAIN, UNSPECIFIED TYPE: Primary | ICD-10-CM

## 2024-08-04 PROBLEM — F17.200 NICOTINE DEPENDENCE: Status: ACTIVE | Noted: 2024-08-04

## 2024-08-04 PROBLEM — E78.5 HYPERLIPIDEMIA: Chronic | Status: ACTIVE | Noted: 2024-06-17

## 2024-08-04 PROBLEM — F17.200 NICOTINE DEPENDENCE: Chronic | Status: ACTIVE | Noted: 2024-08-04

## 2024-08-04 PROBLEM — G89.29 CHRONIC LOW BACK PAIN: Status: ACTIVE | Noted: 2024-08-04

## 2024-08-04 PROBLEM — I21.9 ACUTE MYOCARDIAL INFARCTION (MULTI): Status: ACTIVE | Noted: 2021-06-24

## 2024-08-04 PROBLEM — E66.01 MORBID OBESITY WITH BMI OF 40.0-44.9, ADULT (MULTI): Chronic | Status: ACTIVE | Noted: 2024-08-04

## 2024-08-04 PROBLEM — I10 BENIGN ESSENTIAL HYPERTENSION: Chronic | Status: ACTIVE | Noted: 2024-06-17

## 2024-08-04 PROBLEM — M54.50 CHRONIC LOW BACK PAIN: Status: ACTIVE | Noted: 2024-08-04

## 2024-08-04 LAB
ALBUMIN SERPL BCP-MCNC: 4.1 G/DL (ref 3.4–5)
ALP SERPL-CCNC: 78 U/L (ref 33–120)
ALT SERPL W P-5'-P-CCNC: 37 U/L (ref 10–52)
ANION GAP SERPL CALC-SCNC: 12 MMOL/L (ref 10–20)
AST SERPL W P-5'-P-CCNC: 25 U/L (ref 9–39)
BASOPHILS # BLD AUTO: 0.08 X10*3/UL (ref 0–0.1)
BASOPHILS NFR BLD AUTO: 0.9 %
BILIRUB SERPL-MCNC: 0.6 MG/DL (ref 0–1.2)
BUN SERPL-MCNC: 11 MG/DL (ref 6–23)
CALCIUM SERPL-MCNC: 9.2 MG/DL (ref 8.6–10.3)
CARDIAC TROPONIN I PNL SERPL HS: 5 NG/L (ref 0–20)
CARDIAC TROPONIN I PNL SERPL HS: 5 NG/L (ref 0–20)
CHLORIDE SERPL-SCNC: 106 MMOL/L (ref 98–107)
CO2 SERPL-SCNC: 25 MMOL/L (ref 21–32)
CREAT SERPL-MCNC: 0.97 MG/DL (ref 0.5–1.3)
EGFRCR SERPLBLD CKD-EPI 2021: >90 ML/MIN/1.73M*2
EOSINOPHIL # BLD AUTO: 0.29 X10*3/UL (ref 0–0.7)
EOSINOPHIL NFR BLD AUTO: 3.4 %
ERYTHROCYTE [DISTWIDTH] IN BLOOD BY AUTOMATED COUNT: 13.2 % (ref 11.5–14.5)
GLUCOSE SERPL-MCNC: 99 MG/DL (ref 74–99)
HCT VFR BLD AUTO: 49.6 % (ref 41–52)
HGB BLD-MCNC: 17.7 G/DL (ref 13.5–17.5)
IMM GRANULOCYTES # BLD AUTO: 0.03 X10*3/UL (ref 0–0.7)
IMM GRANULOCYTES NFR BLD AUTO: 0.3 % (ref 0–0.9)
LYMPHOCYTES # BLD AUTO: 2.28 X10*3/UL (ref 1.2–4.8)
LYMPHOCYTES NFR BLD AUTO: 26.5 %
MAGNESIUM SERPL-MCNC: 2 MG/DL (ref 1.6–2.4)
MCH RBC QN AUTO: 30.6 PG (ref 26–34)
MCHC RBC AUTO-ENTMCNC: 35.7 G/DL (ref 32–36)
MCV RBC AUTO: 86 FL (ref 80–100)
MONOCYTES # BLD AUTO: 0.63 X10*3/UL (ref 0.1–1)
MONOCYTES NFR BLD AUTO: 7.3 %
NEUTROPHILS # BLD AUTO: 5.3 X10*3/UL (ref 1.2–7.7)
NEUTROPHILS NFR BLD AUTO: 61.6 %
NRBC BLD-RTO: 0 /100 WBCS (ref 0–0)
PLATELET # BLD AUTO: 278 X10*3/UL (ref 150–450)
POTASSIUM SERPL-SCNC: 4.6 MMOL/L (ref 3.5–5.3)
PROT SERPL-MCNC: 7 G/DL (ref 6.4–8.2)
RBC # BLD AUTO: 5.79 X10*6/UL (ref 4.5–5.9)
SODIUM SERPL-SCNC: 138 MMOL/L (ref 136–145)
WBC # BLD AUTO: 8.6 X10*3/UL (ref 4.4–11.3)

## 2024-08-04 PROCEDURE — G0378 HOSPITAL OBSERVATION PER HR: HCPCS

## 2024-08-04 PROCEDURE — 71045 X-RAY EXAM CHEST 1 VIEW: CPT

## 2024-08-04 PROCEDURE — 93005 ELECTROCARDIOGRAM TRACING: CPT

## 2024-08-04 PROCEDURE — 2500000004 HC RX 250 GENERAL PHARMACY W/ HCPCS (ALT 636 FOR OP/ED)

## 2024-08-04 PROCEDURE — 80053 COMPREHEN METABOLIC PANEL: CPT | Performed by: EMERGENCY MEDICINE

## 2024-08-04 PROCEDURE — 99285 EMERGENCY DEPT VISIT HI MDM: CPT

## 2024-08-04 PROCEDURE — 96372 THER/PROPH/DIAG INJ SC/IM: CPT

## 2024-08-04 PROCEDURE — 2500000002 HC RX 250 W HCPCS SELF ADMINISTERED DRUGS (ALT 637 FOR MEDICARE OP, ALT 636 FOR OP/ED)

## 2024-08-04 PROCEDURE — 2500000001 HC RX 250 WO HCPCS SELF ADMINISTERED DRUGS (ALT 637 FOR MEDICARE OP)

## 2024-08-04 PROCEDURE — 85025 COMPLETE CBC W/AUTO DIFF WBC: CPT | Performed by: EMERGENCY MEDICINE

## 2024-08-04 PROCEDURE — 36415 COLL VENOUS BLD VENIPUNCTURE: CPT | Performed by: EMERGENCY MEDICINE

## 2024-08-04 PROCEDURE — 84484 ASSAY OF TROPONIN QUANT: CPT | Performed by: EMERGENCY MEDICINE

## 2024-08-04 PROCEDURE — 99223 1ST HOSP IP/OBS HIGH 75: CPT

## 2024-08-04 PROCEDURE — 71045 X-RAY EXAM CHEST 1 VIEW: CPT | Performed by: RADIOLOGY

## 2024-08-04 PROCEDURE — 83735 ASSAY OF MAGNESIUM: CPT | Performed by: EMERGENCY MEDICINE

## 2024-08-04 PROCEDURE — 2500000001 HC RX 250 WO HCPCS SELF ADMINISTERED DRUGS (ALT 637 FOR MEDICARE OP): Performed by: EMERGENCY MEDICINE

## 2024-08-04 PROCEDURE — 2500000004 HC RX 250 GENERAL PHARMACY W/ HCPCS (ALT 636 FOR OP/ED): Performed by: STUDENT IN AN ORGANIZED HEALTH CARE EDUCATION/TRAINING PROGRAM

## 2024-08-04 RX ORDER — POLYETHYLENE GLYCOL 3350 17 G/17G
17 POWDER, FOR SOLUTION ORAL DAILY
Status: DISCONTINUED | OUTPATIENT
Start: 2024-08-04 | End: 2024-08-06 | Stop reason: HOSPADM

## 2024-08-04 RX ORDER — TALC
3 POWDER (GRAM) TOPICAL NIGHTLY PRN
Status: DISCONTINUED | OUTPATIENT
Start: 2024-08-04 | End: 2024-08-06 | Stop reason: HOSPADM

## 2024-08-04 RX ORDER — BISACODYL 5 MG
10 TABLET, DELAYED RELEASE (ENTERIC COATED) ORAL DAILY PRN
Status: DISCONTINUED | OUTPATIENT
Start: 2024-08-04 | End: 2024-08-06 | Stop reason: HOSPADM

## 2024-08-04 RX ORDER — METOPROLOL TARTRATE 25 MG/1
25 TABLET, FILM COATED ORAL 2 TIMES DAILY
Status: DISCONTINUED | OUTPATIENT
Start: 2024-08-04 | End: 2024-08-06 | Stop reason: HOSPADM

## 2024-08-04 RX ORDER — NAPROXEN SODIUM 220 MG/1
324 TABLET, FILM COATED ORAL ONCE
Status: COMPLETED | OUTPATIENT
Start: 2024-08-04 | End: 2024-08-04

## 2024-08-04 RX ORDER — ONDANSETRON HYDROCHLORIDE 2 MG/ML
4 INJECTION, SOLUTION INTRAVENOUS EVERY 6 HOURS PRN
Status: DISCONTINUED | OUTPATIENT
Start: 2024-08-04 | End: 2024-08-06 | Stop reason: HOSPADM

## 2024-08-04 RX ORDER — PANTOPRAZOLE SODIUM 40 MG/1
40 TABLET, DELAYED RELEASE ORAL
Status: DISCONTINUED | OUTPATIENT
Start: 2024-08-05 | End: 2024-08-06 | Stop reason: HOSPADM

## 2024-08-04 RX ORDER — ACETAMINOPHEN 325 MG/1
650 TABLET ORAL EVERY 4 HOURS PRN
Status: DISCONTINUED | OUTPATIENT
Start: 2024-08-04 | End: 2024-08-06 | Stop reason: HOSPADM

## 2024-08-04 RX ORDER — PANTOPRAZOLE SODIUM 40 MG/10ML
40 INJECTION, POWDER, LYOPHILIZED, FOR SOLUTION INTRAVENOUS
Status: DISCONTINUED | OUTPATIENT
Start: 2024-08-05 | End: 2024-08-06 | Stop reason: HOSPADM

## 2024-08-04 RX ORDER — GUAIFENESIN 600 MG/1
600 TABLET, EXTENDED RELEASE ORAL EVERY 12 HOURS PRN
Status: DISCONTINUED | OUTPATIENT
Start: 2024-08-04 | End: 2024-08-06 | Stop reason: HOSPADM

## 2024-08-04 RX ORDER — CLOPIDOGREL BISULFATE 75 MG/1
75 TABLET ORAL DAILY
Status: DISCONTINUED | OUTPATIENT
Start: 2024-08-04 | End: 2024-08-06 | Stop reason: HOSPADM

## 2024-08-04 RX ORDER — ACETAMINOPHEN 325 MG/1
650 TABLET ORAL ONCE
Status: COMPLETED | OUTPATIENT
Start: 2024-08-04 | End: 2024-08-04

## 2024-08-04 RX ORDER — ASPIRIN 81 MG/1
81 TABLET ORAL DAILY
Status: DISCONTINUED | OUTPATIENT
Start: 2024-08-05 | End: 2024-08-06 | Stop reason: HOSPADM

## 2024-08-04 RX ORDER — NITROGLYCERIN 0.4 MG/1
0.4 TABLET SUBLINGUAL EVERY 5 MIN PRN
Status: DISCONTINUED | OUTPATIENT
Start: 2024-08-04 | End: 2024-08-04

## 2024-08-04 RX ORDER — ENOXAPARIN SODIUM 100 MG/ML
40 INJECTION SUBCUTANEOUS EVERY 12 HOURS SCHEDULED
Status: DISCONTINUED | OUTPATIENT
Start: 2024-08-04 | End: 2024-08-06 | Stop reason: HOSPADM

## 2024-08-04 RX ORDER — LISINOPRIL 20 MG/1
40 TABLET ORAL DAILY
Status: DISCONTINUED | OUTPATIENT
Start: 2024-08-04 | End: 2024-08-06 | Stop reason: HOSPADM

## 2024-08-04 RX ORDER — MORPHINE SULFATE 2 MG/ML
2 INJECTION, SOLUTION INTRAMUSCULAR; INTRAVENOUS ONCE
Status: COMPLETED | OUTPATIENT
Start: 2024-08-04 | End: 2024-08-04

## 2024-08-04 RX ORDER — ROSUVASTATIN CALCIUM 20 MG/1
40 TABLET, COATED ORAL DAILY
Status: DISCONTINUED | OUTPATIENT
Start: 2024-08-04 | End: 2024-08-06 | Stop reason: HOSPADM

## 2024-08-04 RX ORDER — BISACODYL 10 MG/1
10 SUPPOSITORY RECTAL DAILY PRN
Status: DISCONTINUED | OUTPATIENT
Start: 2024-08-04 | End: 2024-08-06 | Stop reason: HOSPADM

## 2024-08-04 RX ADMIN — GUAIFENESIN 600 MG: 600 TABLET ORAL at 15:59

## 2024-08-04 RX ADMIN — ACETAMINOPHEN 650 MG: 325 TABLET ORAL at 13:45

## 2024-08-04 RX ADMIN — METOPROLOL TARTRATE 25 MG: 25 TABLET, FILM COATED ORAL at 20:01

## 2024-08-04 RX ADMIN — NITROGLYCERIN 0.4 MG: 0.4 TABLET SUBLINGUAL at 13:52

## 2024-08-04 RX ADMIN — NITROGLYCERIN 0.4 MG: 0.4 TABLET SUBLINGUAL at 13:45

## 2024-08-04 RX ADMIN — CLOPIDOGREL 75 MG: 75 TABLET ORAL at 15:59

## 2024-08-04 RX ADMIN — ROSUVASTATIN 40 MG: 20 TABLET, FILM COATED ORAL at 20:01

## 2024-08-04 RX ADMIN — ASPIRIN 81 MG CHEWABLE TABLET 324 MG: 81 TABLET CHEWABLE at 14:00

## 2024-08-04 RX ADMIN — MORPHINE SULFATE 2 MG: 2 INJECTION, SOLUTION INTRAMUSCULAR; INTRAVENOUS at 20:46

## 2024-08-04 RX ADMIN — ENOXAPARIN SODIUM 40 MG: 40 INJECTION SUBCUTANEOUS at 15:58

## 2024-08-04 RX ADMIN — ACETAMINOPHEN 650 MG: 325 TABLET ORAL at 20:01

## 2024-08-04 RX ADMIN — NITROGLYCERIN 0.4 MG: 0.4 TABLET SUBLINGUAL at 14:00

## 2024-08-04 RX ADMIN — LISINOPRIL 40 MG: 20 TABLET ORAL at 20:01

## 2024-08-04 SDOH — ECONOMIC STABILITY: FOOD INSECURITY: WITHIN THE PAST 12 MONTHS, THE FOOD YOU BOUGHT JUST DIDN'T LAST AND YOU DIDN'T HAVE MONEY TO GET MORE.: NEVER TRUE

## 2024-08-04 SDOH — SOCIAL STABILITY: SOCIAL INSECURITY: ARE YOU OR HAVE YOU BEEN THREATENED OR ABUSED PHYSICALLY, EMOTIONALLY, OR SEXUALLY BY ANYONE?: NO

## 2024-08-04 SDOH — SOCIAL STABILITY: SOCIAL INSECURITY: DO YOU FEEL UNSAFE GOING BACK TO THE PLACE WHERE YOU ARE LIVING?: NO

## 2024-08-04 SDOH — SOCIAL STABILITY: SOCIAL INSECURITY: ABUSE: ADULT

## 2024-08-04 SDOH — SOCIAL STABILITY: SOCIAL NETWORK
DO YOU BELONG TO ANY CLUBS OR ORGANIZATIONS SUCH AS CHURCH GROUPS UNIONS, FRATERNAL OR ATHLETIC GROUPS, OR SCHOOL GROUPS?: YES

## 2024-08-04 SDOH — SOCIAL STABILITY: SOCIAL NETWORK: ARE YOU MARRIED, WIDOWED, DIVORCED, SEPARATED, NEVER MARRIED, OR LIVING WITH A PARTNER?: DIVORCED

## 2024-08-04 SDOH — SOCIAL STABILITY: SOCIAL INSECURITY: WITHIN THE LAST YEAR, HAVE YOU BEEN AFRAID OF YOUR PARTNER OR EX-PARTNER?: NO

## 2024-08-04 SDOH — ECONOMIC STABILITY: INCOME INSECURITY: IN THE PAST 12 MONTHS, HAS THE ELECTRIC, GAS, OIL, OR WATER COMPANY THREATENED TO SHUT OFF SERVICE IN YOUR HOME?: NO

## 2024-08-04 SDOH — HEALTH STABILITY: PHYSICAL HEALTH: ON AVERAGE, HOW MANY MINUTES DO YOU ENGAGE IN EXERCISE AT THIS LEVEL?: 0 MIN

## 2024-08-04 SDOH — SOCIAL STABILITY: SOCIAL INSECURITY
WITHIN THE LAST YEAR, HAVE TO BEEN RAPED OR FORCED TO HAVE ANY KIND OF SEXUAL ACTIVITY BY YOUR PARTNER OR EX-PARTNER?: NO

## 2024-08-04 SDOH — SOCIAL STABILITY: SOCIAL INSECURITY: WITHIN THE LAST YEAR, HAVE YOU BEEN HUMILIATED OR EMOTIONALLY ABUSED IN OTHER WAYS BY YOUR PARTNER OR EX-PARTNER?: NO

## 2024-08-04 SDOH — SOCIAL STABILITY: SOCIAL INSECURITY: DO YOU FEEL ANYONE HAS EXPLOITED OR TAKEN ADVANTAGE OF YOU FINANCIALLY OR OF YOUR PERSONAL PROPERTY?: YES

## 2024-08-04 SDOH — SOCIAL STABILITY: SOCIAL INSECURITY: DOES ANYONE TRY TO KEEP YOU FROM HAVING/CONTACTING OTHER FRIENDS OR DOING THINGS OUTSIDE YOUR HOME?: NO

## 2024-08-04 SDOH — ECONOMIC STABILITY: HOUSING INSECURITY: AT ANY TIME IN THE PAST 12 MONTHS, WERE YOU HOMELESS OR LIVING IN A SHELTER (INCLUDING NOW)?: NO

## 2024-08-04 SDOH — HEALTH STABILITY: MENTAL HEALTH
HOW OFTEN DO YOU NEED TO HAVE SOMEONE HELP YOU WHEN YOU READ INSTRUCTIONS, PAMPHLETS, OR OTHER WRITTEN MATERIAL FROM YOUR DOCTOR OR PHARMACY?: NEVER

## 2024-08-04 SDOH — ECONOMIC STABILITY: INCOME INSECURITY: IN THE LAST 12 MONTHS, WAS THERE A TIME WHEN YOU WERE NOT ABLE TO PAY THE MORTGAGE OR RENT ON TIME?: NO

## 2024-08-04 SDOH — ECONOMIC STABILITY: FOOD INSECURITY: WITHIN THE PAST 12 MONTHS, YOU WORRIED THAT YOUR FOOD WOULD RUN OUT BEFORE YOU GOT MONEY TO BUY MORE.: NEVER TRUE

## 2024-08-04 SDOH — SOCIAL STABILITY: SOCIAL INSECURITY: HAS ANYONE EVER THREATENED TO HURT YOUR FAMILY OR YOUR PETS?: YES

## 2024-08-04 SDOH — HEALTH STABILITY: MENTAL HEALTH
STRESS IS WHEN SOMEONE FEELS TENSE, NERVOUS, ANXIOUS, OR CAN'T SLEEP AT NIGHT BECAUSE THEIR MIND IS TROUBLED. HOW STRESSED ARE YOU?: VERY MUCH

## 2024-08-04 SDOH — SOCIAL STABILITY: SOCIAL INSECURITY: HAVE YOU HAD THOUGHTS OF HARMING ANYONE ELSE?: NO

## 2024-08-04 SDOH — SOCIAL STABILITY: SOCIAL INSECURITY
WITHIN THE LAST YEAR, HAVE YOU BEEN KICKED, HIT, SLAPPED, OR OTHERWISE PHYSICALLY HURT BY YOUR PARTNER OR EX-PARTNER?: NO

## 2024-08-04 SDOH — ECONOMIC STABILITY: HOUSING INSECURITY: IN THE PAST 12 MONTHS, HOW MANY TIMES HAVE YOU MOVED WHERE YOU WERE LIVING?: 1

## 2024-08-04 SDOH — SOCIAL STABILITY: SOCIAL NETWORK: HOW OFTEN DO YOU GET TOGETHER WITH FRIENDS OR RELATIVES?: ONCE A WEEK

## 2024-08-04 SDOH — SOCIAL STABILITY: SOCIAL INSECURITY

## 2024-08-04 SDOH — SOCIAL STABILITY: SOCIAL NETWORK
IN A TYPICAL WEEK, HOW MANY TIMES DO YOU TALK ON THE PHONE WITH FAMILY, FRIENDS, OR NEIGHBORS?: MORE THAN THREE TIMES A WEEK

## 2024-08-04 SDOH — ECONOMIC STABILITY: INCOME INSECURITY: HOW HARD IS IT FOR YOU TO PAY FOR THE VERY BASICS LIKE FOOD, HOUSING, MEDICAL CARE, AND HEATING?: NOT HARD AT ALL

## 2024-08-04 SDOH — SOCIAL STABILITY: SOCIAL NETWORK: HOW OFTEN DO YOU ATTEND CHURCH OR RELIGIOUS SERVICES?: NEVER

## 2024-08-04 SDOH — HEALTH STABILITY: PHYSICAL HEALTH: ON AVERAGE, HOW MANY DAYS PER WEEK DO YOU ENGAGE IN MODERATE TO STRENUOUS EXERCISE (LIKE A BRISK WALK)?: 0 DAYS

## 2024-08-04 SDOH — SOCIAL STABILITY: SOCIAL INSECURITY: HAVE YOU HAD ANY THOUGHTS OF HARMING ANYONE ELSE?: NO

## 2024-08-04 SDOH — ECONOMIC STABILITY: TRANSPORTATION INSECURITY
IN THE PAST 12 MONTHS, HAS THE LACK OF TRANSPORTATION KEPT YOU FROM MEDICAL APPOINTMENTS OR FROM GETTING MEDICATIONS?: NO

## 2024-08-04 SDOH — SOCIAL STABILITY: SOCIAL INSECURITY: ARE THERE ANY APPARENT SIGNS OF INJURIES/BEHAVIORS THAT COULD BE RELATED TO ABUSE/NEGLECT?: NO

## 2024-08-04 SDOH — SOCIAL STABILITY: SOCIAL NETWORK: HOW OFTEN DO YOU ATTENT MEETINGS OF THE CLUB OR ORGANIZATION YOU BELONG TO?: MORE THAN 4 TIMES PER YEAR

## 2024-08-04 SDOH — ECONOMIC STABILITY: TRANSPORTATION INSECURITY
IN THE PAST 12 MONTHS, HAS LACK OF TRANSPORTATION KEPT YOU FROM MEETINGS, WORK, OR FROM GETTING THINGS NEEDED FOR DAILY LIVING?: NO

## 2024-08-04 ASSESSMENT — COLUMBIA-SUICIDE SEVERITY RATING SCALE - C-SSRS
2. HAVE YOU ACTUALLY HAD ANY THOUGHTS OF KILLING YOURSELF?: NO
1. IN THE PAST MONTH, HAVE YOU WISHED YOU WERE DEAD OR WISHED YOU COULD GO TO SLEEP AND NOT WAKE UP?: NO
6. HAVE YOU EVER DONE ANYTHING, STARTED TO DO ANYTHING, OR PREPARED TO DO ANYTHING TO END YOUR LIFE?: NO

## 2024-08-04 ASSESSMENT — ACTIVITIES OF DAILY LIVING (ADL)
FEEDING YOURSELF: INDEPENDENT
GROOMING: INDEPENDENT
PATIENT'S MEMORY ADEQUATE TO SAFELY COMPLETE DAILY ACTIVITIES?: YES
DRESSING YOURSELF: INDEPENDENT
WALKS IN HOME: INDEPENDENT
JUDGMENT_ADEQUATE_SAFELY_COMPLETE_DAILY_ACTIVITIES: YES
TOILETING: INDEPENDENT
ADEQUATE_TO_COMPLETE_ADL: YES
BATHING: INDEPENDENT
HEARING - RIGHT EAR: FUNCTIONAL
HEARING - LEFT EAR: FUNCTIONAL

## 2024-08-04 ASSESSMENT — ENCOUNTER SYMPTOMS
JOINT SWELLING: 0
SHORTNESS OF BREATH: 0
FEVER: 0
BACK PAIN: 0
COUGH: 0
ABDOMINAL PAIN: 0
CHEST TIGHTNESS: 1
TREMORS: 0
HEMATURIA: 0
VOMITING: 0
CONSTIPATION: 0
NAUSEA: 0
DIAPHORESIS: 0
PALPITATIONS: 0
WEAKNESS: 0
FATIGUE: 0
HEADACHES: 0
FLANK PAIN: 0
WHEEZING: 0
DIARRHEA: 0
CHILLS: 0
WOUND: 0

## 2024-08-04 ASSESSMENT — COGNITIVE AND FUNCTIONAL STATUS - GENERAL
PATIENT BASELINE BEDBOUND: NO
DAILY ACTIVITIY SCORE: 24
MOBILITY SCORE: 24
MOBILITY SCORE: 24
DAILY ACTIVITIY SCORE: 24

## 2024-08-04 ASSESSMENT — LIFESTYLE VARIABLES
SUBSTANCE_ABUSE_PAST_12_MONTHS: NO
AUDIT TOTAL SCORE: 7
HOW OFTEN DURING THE LAST YEAR HAVE YOU HAD A FEELING OF GUILT OR REMORSE AFTER DRINKING: NEVER
HOW OFTEN DURING THE LAST YEAR HAVE YOU FAILED TO DO WHAT WAS NORMALLY EXPECTED FROM YOU BECAUSE OF DRINKING: NEVER
HOW OFTEN DURING THE LAST YEAR HAVE YOU FOUND THAT YOU WERE NOT ABLE TO STOP DRINKING ONCE YOU HAD STARTED: NEVER
HOW OFTEN DO YOU HAVE A DRINK CONTAINING ALCOHOL: 2-3 TIMES A WEEK
HOW OFTEN DO YOU HAVE 6 OR MORE DRINKS ON ONE OCCASION: MONTHLY
PRESCIPTION_ABUSE_PAST_12_MONTHS: NO
HOW OFTEN DURING THE LAST YEAR HAVE YOU BEEN UNABLE TO REMEMBER WHAT HAPPENED THE NIGHT BEFORE BECAUSE YOU HAD BEEN DRINKING: NEVER
HOW OFTEN DURING THE LAST YEAR HAVE YOU NEEDED AN ALCOHOLIC DRINK FIRST THING IN THE MORNING TO GET YOURSELF GOING AFTER A NIGHT OF HEAVY DRINKING: NEVER
HAS A RELATIVE, FRIEND, DOCTOR, OR ANOTHER HEALTH PROFESSIONAL EXPRESSED CONCERN ABOUT YOUR DRINKING OR SUGGESTED YOU CUT DOWN: NO
HOW MANY STANDARD DRINKS CONTAINING ALCOHOL DO YOU HAVE ON A TYPICAL DAY: 5 OR 6
SKIP TO QUESTIONS 9-10: 0
AUDIT TOTAL SCORE: 0
AUDIT-C TOTAL SCORE: 7
HAVE YOU OR SOMEONE ELSE BEEN INJURED AS A RESULT OF YOUR DRINKING: NO
AUDIT-C TOTAL SCORE: 7

## 2024-08-04 ASSESSMENT — PAIN DESCRIPTION - PAIN TYPE: TYPE: ACUTE PAIN

## 2024-08-04 ASSESSMENT — PAIN DESCRIPTION - FREQUENCY: FREQUENCY: ONCE A WEEK

## 2024-08-04 ASSESSMENT — PAIN SCALES - GENERAL
PAINLEVEL_OUTOF10: 7
PAINLEVEL_OUTOF10: 5 - MODERATE PAIN
PAINLEVEL_OUTOF10: 5 - MODERATE PAIN
PAINLEVEL_OUTOF10: 6
PAINLEVEL_OUTOF10: 7
PAINLEVEL_OUTOF10: 2
PAINLEVEL_OUTOF10: 4

## 2024-08-04 ASSESSMENT — PATIENT HEALTH QUESTIONNAIRE - PHQ9
1. LITTLE INTEREST OR PLEASURE IN DOING THINGS: NOT AT ALL
SUM OF ALL RESPONSES TO PHQ9 QUESTIONS 1 & 2: 0
2. FEELING DOWN, DEPRESSED OR HOPELESS: NOT AT ALL

## 2024-08-04 ASSESSMENT — PAIN DESCRIPTION - ORIENTATION: ORIENTATION: MID

## 2024-08-04 ASSESSMENT — PAIN - FUNCTIONAL ASSESSMENT: PAIN_FUNCTIONAL_ASSESSMENT: 0-10

## 2024-08-04 ASSESSMENT — PAIN DESCRIPTION - DESCRIPTORS: DESCRIPTORS: TIGHTNESS

## 2024-08-04 NOTE — ED PROVIDER NOTES
HPI   Chief Complaint   Patient presents with    Chest Pain       Patient presents to the emergency department secondary to chest pain.  Patient has had waxing and waning chest pain for about a week now.  He was seen at a [facility] facility this past Monday.  At that time he had workup and it was negative.  He was told that he was not having a stroke or heart attack.  He followed up with Dr. Worthy on Friday.  On Friday he had improved symptoms.  He was told by Dr. Worthy if symptoms got worse again he should come to the emergency department.  Today he had worsening chest pain again.  He has tingling and numbness in his left arm.  He does have a history of coronary artery disease.  He has a stent that was placed a couple years ago.  He is on aspirin but not on antiplatelet medication at this time.  He has not had a recent stress test.  Last catheterization was when he got his stent a couple years ago.                          [place] Coma Scale Score: 15                  Patient History   Past Medical History:   Diagnosis Date    Non-ST elevation (NSTEMI) myocardial infarction (Multi)     NSTEMI, initial episode of care     Past Surgical History:   Procedure Laterality Date    CARDIAC CATHETERIZATION N/A 8/5/2024    Procedure: Left Heart Cath;  Surgeon: Ambrose Worthy MD;  Location: [facility];  Service: Cardiovascular;  Laterality: N/A;    OTHER SURGICAL HISTORY  06/25/2021    Percutaneous transluminal coronary angioplasty     No family history on file.  Social History     Tobacco Use    Smoking status: Every Day     Current packs/day: 1.00     Average packs/day: 1 pack/day for 30.0 years (30.0 ttl pk-yrs)     Types: Cigarettes    Smokeless tobacco: Never   Substance Use Topics    Alcohol use: Yes     Alcohol/week: 2.0 standard drinks of alcohol     Types: 2 Cans of beer per week    Drug use: Never       Physical Exam   ED Triage Vitals [08/04/24 1151]   Temperature Heart Rate Respirations BP   36.9 °C  (98.4 °F) 69 16 (!) 166/101      Pulse Ox Temp src Heart Rate Source Patient Position   97 % -- -- --      BP Location FiO2 (%)     -- 21 %       Physical Exam  Vitals and nursing note reviewed.   Constitutional:       Appearance: Normal appearance. He is well-developed.   HENT:      Head: Normocephalic and atraumatic.      Right Ear: Tympanic membrane normal.      Left Ear: Tympanic membrane normal.      Nose: Nose normal.      Mouth/Throat:      Mouth: Mucous membranes are moist.   Eyes:      Extraocular Movements: Extraocular movements intact.      Pupils: Pupils are equal, round, and reactive to light.   Cardiovascular:      Rate and Rhythm: Normal rate and regular rhythm.      Pulses: Normal pulses.      Heart sounds: Normal heart sounds.   Pulmonary:      Effort: Pulmonary effort is normal.      Breath sounds: Normal breath sounds. No decreased breath sounds, wheezing, rhonchi or rales.   Chest:      Chest wall: No tenderness or crepitus.   Abdominal:      General: Abdomen is flat. Bowel sounds are normal.      Palpations: Abdomen is soft. There is no mass.      Tenderness: There is no abdominal tenderness. There is no guarding or rebound.   Musculoskeletal:         General: Normal range of motion.      Cervical back: Normal range of motion.      Right lower leg: No tenderness. No edema.      Left lower leg: No tenderness. No edema.   Skin:     General: Skin is warm and dry.      Capillary Refill: Capillary refill takes less than 2 seconds.   Neurological:      General: No focal deficit present.      Mental Status: He is alert and oriented to person, place, and time.   Psychiatric:         Mood and Affect: Mood normal.         Behavior: Behavior normal.       Labs Reviewed   CBC WITH AUTO DIFFERENTIAL - Abnormal       Result Value    WBC 8.6      nRBC 0.0      RBC 5.79      Hemoglobin 17.7 (*)     Hematocrit 49.6      MCV 86      MCH 30.6      MCHC 35.7      RDW 13.2      Platelets 278      Neutrophils % 61.6       Immature Granulocytes %, Automated 0.3      Lymphocytes % 26.5      Monocytes % 7.3      Eosinophils % 3.4      Basophils % 0.9      Neutrophils Absolute 5.30      Immature Granulocytes Absolute, Automated 0.03      Lymphocytes Absolute 2.28      Monocytes Absolute 0.63      Eosinophils Absolute 0.29      Basophils Absolute 0.08     COMPREHENSIVE METABOLIC PANEL - Abnormal    Glucose 105 (*)     Sodium 137      Potassium 4.0      Chloride 108 (*)     Bicarbonate 23      Anion Gap 10      Urea Nitrogen 11      Creatinine 0.94      eGFR >90      Calcium 8.8      Albumin 3.7      Alkaline Phosphatase 68      Total Protein 6.4      AST 19      Bilirubin, Total 0.5      ALT 30     HEMOGLOBIN A1C - Abnormal    Hemoglobin A1C 6.3 (*)     Estimated Average Glucose 134      Narrative:     Diagnosis of Diabetes-Adults  Non-Diabetic: < or = 5.6%  Increased risk for developing diabetes: 5.7-6.4%  Diagnostic of diabetes: > or = 6.5%       TSH WITH REFLEX TO FREE T4 IF ABNORMAL - Abnormal    Thyroid Stimulating Hormone 4.97 (*)     Narrative:     TSH testing is performed using different testing methodology at [facility] than at other [facility]. Direct result comparisons should only be made within the same method.     COMPREHENSIVE METABOLIC PANEL - Normal    Glucose 99      Sodium 138      Potassium 4.6      Chloride 106      Bicarbonate 25      Anion Gap 12      Urea Nitrogen 11      Creatinine 0.97      eGFR >90      Calcium 9.2      Albumin 4.1      Alkaline Phosphatase 78      Total Protein 7.0      AST 25      Bilirubin, Total 0.6      ALT 37     MAGNESIUM - Normal    Magnesium 2.00     SERIAL TROPONIN-INITIAL - Normal    Troponin I, High Sensitivity 5      Narrative:     Less than 99th percentile of normal range cutoff-  Female and children under 18 years old <14 ng/L; Male <21 ng/L: Negative  Repeat testing should be performed if clinically indicated.     Female and children under 18 years old  14-50 ng/L; Male 21-50 ng/L:  Consistent with possible cardiac damage and possible increased clinical   risk. Serial measurements may help to assess extent of myocardial damage.     >50 ng/L: Consistent with cardiac damage, increased clinical risk and  myocardial infarction. Serial measurements may help assess extent of   myocardial damage.      NOTE: Children less than 1 year old may have higher baseline troponin   levels and results should be interpreted in conjunction with the overall   clinical context.     NOTE: Troponin I testing is performed using a different   testing methodology at [facility] than at other   [facility]. Direct result comparisons should only   be made within the same method.   SERIAL TROPONIN, 1 HOUR - Normal    Troponin I, High Sensitivity 5      Narrative:     Less than 99th percentile of normal range cutoff-  Female and children under 18 years old <14 ng/L; Male <21 ng/L: Negative  Repeat testing should be performed if clinically indicated.     Female and children under 18 years old 14-50 ng/L; Male 21-50 ng/L:  Consistent with possible cardiac damage and possible increased clinical   risk. Serial measurements may help to assess extent of myocardial damage.     >50 ng/L: Consistent with cardiac damage, increased clinical risk and  myocardial infarction. Serial measurements may help assess extent of   myocardial damage.      NOTE: Children less than 1 year old may have higher baseline troponin   levels and results should be interpreted in conjunction with the overall   clinical context.     NOTE: Troponin I testing is performed using a different   testing methodology at [facility] than at other   [facility]. Direct result comparisons should only   be made within the same method.   THYROXINE, FREE - Normal    Thyroxine, Free 0.92      Narrative:     Thyroxine Free testing is performed using different testing methodology at [facility]  than at other [facility] hospitals. Direct result comparisons should only be made within the same method.    Biotin can cause falsely elevated free T4 results. Patients taking a Biotin dose of up to 10 mg/day should refrain from taking Biotin for 24 hours before sample collection. Patient taking a Biotin dose of >10 mg/day should consult with their physician or the laboratory before the blood draw.   TROPONIN SERIES- (INITIAL, 1 HR)    Narrative:     The following orders were created for panel order Troponin I Series, High Sensitivity (0, 1 HR).  Procedure                               Abnormality         Status                     ---------                               -----------         ------                     Troponin I, High Sensiti...[[id number]]  Normal              Final result               Troponin, High Sensitivi...[[id number]]  Normal              Final result                 Please view results for these tests on the individual orders.   CBC WITH AUTO DIFFERENTIAL    WBC 6.4      nRBC 0.0      RBC 5.40      Hemoglobin 16.2      Hematocrit 46.8      MCV 87      MCH 30.0      MCHC 34.6      RDW 13.3      Platelets 263      Neutrophils % 58.4      Immature Granulocytes %, Automated 0.3      Lymphocytes % 26.6      Monocytes % 8.9      Eosinophils % 4.7      Basophils % 1.1      Neutrophils Absolute 3.72      Immature Granulocytes Absolute, Automated 0.02      Lymphocytes Absolute 1.70      Monocytes Absolute 0.57      Eosinophils Absolute 0.30      Basophils Absolute 0.07       Pain Management Panel           No data to display              Cardiac Catheterization Procedure   Final Result      Transthoracic Echo (TTE) Complete   Final Result      XR chest 1 view   Final Result   1. Marked tortuous aorta. Aneurysm or dissection can not be excluded.   If there is such clinical concern or concern for acute pulmonary   embolic disease, further evaluation with chest CT is recommended for   better assessment.         2. No consolidations, effusions, infiltrates or pneumothorax.                  Signed by: Alen Corbin 8/4/2024 1:07 PM   Dictation workstation:   [id number]        ED Course & MDM   Diagnoses as of 08/07/24 2238   Chest pain, unspecified type       Medical Decision Making  Patient presents secondary to chest pain.  Differential diagnosis for this patient is coronary artery disease, MI, pneumonia.  Patient is evaluated in the emergency department chest x-ray EKG and laboratory workup.  EKG was obtained at 12:06 PM.  It is sinus rhythm rate of 73.  No acute ST elevation.  MT interval is 195 and QTc is 425.  Initial troponin is negative.  Laboratory workup is otherwise unremarkable.  Repeat EKG was obtained at 1:45 PM.  It is sinus rhythm rate of 70.  No acute ST elevation.  MT interval is 195 and QTc is 423.  Patient is now on his second visit with chest pain.  He does have a cardiac history.  At this time he would warrant admission to the hospital for further cardiac workup.  Patient was discussed with the physician assistant on for the hospitalist group.  Plan is for admission to the stepdown unit.        Procedure  Procedures     Viviana Khan MD  08/04/24 8990       Viviana Khan MD  08/07/24 0606

## 2024-08-04 NOTE — H&P
[facility] - GENERAL MEDICINE HISTORY AND PHYSICAL    History Obtained From: Patient    History Of Present Illness:  Gulshan Martinez is a 48 y.o. male with PMHx s/f CAD with h/o STEMI s/p POBA to D2 and LOLI to mid LAD 06/24/2021, HTN, hyperlipidemia and tobacco abuse presenting with chest pain. On 07/29/2024, the patient presented to the ED in [place] with chest pain with negative workup. He was told he was not having a MI or stroke. He continues to have intermittent chest pain throughout last week and met with his cardiologist Dr. Worthy on 08/02/24, who told him to come to ED if he's continuing to have worsening recurrent chest pain. Today, he notes that his chest pain started around 9am while at rest and has been ongoing, rating 7/10 pain, midsternal with radiation to left arm and back as well as pain across the chest along the diaphragmatic area. Per chart review, his previously ordered stress test and echocardiogram was not performed as the patient does not have insurance. His dad had MI with unknown age when that occurred, HTN and mom with diabetes. He smokes 0.5 ppd for the past 2 yrs, used to smoke 1 ppd for 30 yrs. Denies diaphoresis, headache, dizziness, n/v, palpitations, pleuritic pain, shortness of breath, abdominal pain, leg swelling, syncope, changes in urine and bowel symptoms.    ED Course (Summary):   Vitals on presentation: 98.4 F, 69 bpm, 16 RR, 166/101, 97% on RA  Labs: CMP-unremarkable  Troponin negative  CBC-unremarkable  Imaging: CXR - Marked tortuous aorta. Aneurysm or dissection can not be excluded.   Interventions: Tylenol 650 mg, aspirin 324 mg, admission for further management    ED Course (From Provider):  Diagnoses as of 08/04/24 1407   Chest pain, unspecified type     Relevant Results  Results for orders placed or performed during the hospital encounter of 08/04/24 (from the past 24 hour(s))   CBC and Auto Differential   Result Value Ref Range    WBC 8.6 4.4 - 11.3  x10*3/uL    nRBC 0.0 0.0 - 0.0 /100 WBCs    RBC 5.79 4.50 - 5.90 x10*6/uL    Hemoglobin 17.7 (H) 13.5 - 17.5 g/dL    Hematocrit 49.6 41.0 - 52.0 %    MCV 86 80 - 100 fL    MCH 30.6 26.0 - 34.0 pg    MCHC 35.7 32.0 - 36.0 g/dL    RDW 13.2 11.5 - 14.5 %    Platelets 278 150 - 450 x10*3/uL    Neutrophils % 61.6 40.0 - 80.0 %    Immature Granulocytes %, Automated 0.3 0.0 - 0.9 %    Lymphocytes % 26.5 13.0 - 44.0 %    Monocytes % 7.3 2.0 - 10.0 %    Eosinophils % 3.4 0.0 - 6.0 %    Basophils % 0.9 0.0 - 2.0 %    Neutrophils Absolute 5.30 1.20 - 7.70 x10*3/uL    Immature Granulocytes Absolute, Automated 0.03 0.00 - 0.70 x10*3/uL    Lymphocytes Absolute 2.28 1.20 - 4.80 x10*3/uL    Monocytes Absolute 0.63 0.10 - 1.00 x10*3/uL    Eosinophils Absolute 0.29 0.00 - 0.70 x10*3/uL    Basophils Absolute 0.08 0.00 - 0.10 x10*3/uL   Comprehensive Metabolic Panel   Result Value Ref Range    Glucose 99 74 - 99 mg/dL    Sodium 138 136 - 145 mmol/L    Potassium 4.6 3.5 - 5.3 mmol/L    Chloride 106 98 - 107 mmol/L    Bicarbonate 25 21 - 32 mmol/L    Anion Gap 12 10 - 20 mmol/L    Urea Nitrogen 11 6 - 23 mg/dL    Creatinine 0.97 0.50 - 1.30 mg/dL    eGFR >90 >60 mL/min/1.73m*2    Calcium 9.2 8.6 - 10.3 mg/dL    Albumin 4.1 3.4 - 5.0 g/dL    Alkaline Phosphatase 78 33 - 120 U/L    Total Protein 7.0 6.4 - 8.2 g/dL    AST 25 9 - 39 U/L    Bilirubin, Total 0.6 0.0 - 1.2 mg/dL    ALT 37 10 - 52 U/L   Magnesium   Result Value Ref Range    Magnesium 2.00 1.60 - 2.40 mg/dL   Troponin I, High Sensitivity, Initial   Result Value Ref Range    Troponin I, High Sensitivity 5 0 - 20 ng/L      XR chest 1 view    Result Date: 8/4/2024  Interpreted By:  Alen Corbin, STUDY: XR CHEST 1 VIEW;; 8/4/2024 1:03 pm   INDICATION: Signs/Symptoms:Chest Pain.   COMPARISON: 05/04/2010   ACCESSION NUMBER(S): [id number]   ORDERING CLINICIAN: AUSTYN CM   FINDINGS: Markedly tortuous and possibly ectatic aorta although evaluation is limited to portable  technique and positioning. The cardiac silhouette is stable in size. Lungs are clear bilaterally.       1. Marked tortuous aorta. Aneurysm or dissection can not be excluded. If there is such clinical concern or concern for acute pulmonary embolic disease, further evaluation with chest CT is recommended for better assessment.   2. No consolidations, effusions, infiltrates or pneumothorax.       Signed by: Alen Corbin 8/4/2024 1:07 PM Dictation workstation:   [id number]    Scheduled medications:  [START ON 8/5/2024] aspirin, 81 mg, oral, Daily  clopidogrel, 75 mg, oral, Daily  enoxaparin, 40 mg, subcutaneous, q12h TAISHA  lisinopril, 40 mg, oral, Daily  metoprolol tartrate, 25 mg, oral, BID  [START ON 8/5/2024] pantoprazole, 40 mg, oral, Daily before breakfast   Or  [START ON 8/5/2024] pantoprazole, 40 mg, intravenous, Daily before breakfast  perflutren protein A microsphere, 0.5 mL, intravenous, Once in imaging  polyethylene glycol, 17 g, oral, Daily  rosuvastatin, 40 mg, oral, Daily      Continuous medications:     PRN medications:  PRN medications: acetaminophen, bisacodyl, bisacodyl, guaiFENesin, melatonin, nitroglycerin, ondansetron     Past Medical History  He has a past medical history of Non-ST elevation (NSTEMI) myocardial infarction (Multi).    Surgical History  He has a past surgical history that includes Other surgical history (06/25/2021).     Social History  He reports that he has been smoking cigarettes. He has a 30 pack-year smoking history. He has never used smokeless tobacco. He reports current alcohol use of about 2.0 standard drinks of alcohol per week. He reports that he does not use drugs.    Family History  No family history on file.    Allergies  Patient has no known allergies.    Code Status  Full Code     Review of Systems   Constitutional:  Negative for chills, diaphoresis, fatigue and fever.   HENT:  Negative for congestion.    Respiratory:  Positive for chest tightness. Negative for cough,  shortness of breath and wheezing.    Cardiovascular:  Positive for chest pain. Negative for palpitations and leg swelling.   Gastrointestinal:  Negative for abdominal pain, constipation, diarrhea, nausea and vomiting.   Genitourinary:  Negative for flank pain and hematuria.   Musculoskeletal:  Negative for back pain and joint swelling.   Skin:  Negative for rash and wound.   Neurological:  Negative for tremors, syncope, weakness and headaches.       Last Recorded Vitals  /84   Pulse 77   Temp 36.9 °C (98.4 °F)   Resp 16   Wt 144 kg (318 lb)   SpO2 97%      Physical Exam:  Vital signs and nursing notes reviewed.   Constitutional: Pleasant and cooperative. Laying in bed in acute distress. Conversant.   Skin: Warm and dry; no obvious lesions, rashes, pallor, or jaundice. Good turgor.   Eyes: EOMI. Anicteric sclera.   ENT: Mucous membranes moist; no obvious injury or deformity appreciated.   Head and Neck: Normocephalic, atraumatic. ROM preserved. Trachea midline. No appreciable JVD.   Respiratory: Nonlabored on RA. Lungs clear to auscultation bilaterally without obvious adventitious sounds. Chest rise is equal.  Cardiovascular: RRR. No gross murmur, gallop, or rub. Extremities are warm and well-perfused with good capillary refill (< 3 seconds). Left chest wall tenderness.  GI: Abdomen soft, nondistended, tender epigastric and RUQ. No obvious organomegaly appreciated. Bowel sounds are present and normoactive.  : No CVA tenderness.   MSK: No gross abnormalities appreciated. No limitations to AROM/PROM appreciated.   Extremities: No cyanosis, edema, or clubbing evident. Neurovascularly intact.   Neuro: A&Ox3. CN 2-12 grossly intact. Able to respond to questions appropriately and clearly. No acute focal neurologic deficits appreciated.  Psych: Appropriate mood and behavior.    Assessment/Plan   Principal Problem:    Chest pain, unspecified type  Active Problems:    Atherosclerosis of native coronary artery of  native heart without angina pectoris    Benign essential hypertension    Hyperlipidemia    S/P PTCA (percutaneous transluminal coronary angioplasty)    Nicotine dependence    Morbid obesity with BMI of 40.0-44.9, adult (Multi)    Chest pain  -bilateral BP completed in ED: R: 161/90, L: 163/86  -EKG; sinus rhythm at 70 bpm, low voltage, similar compared to previous EKG  -Troponin negative x 2  -echo pending  -continue telemetry monitoring  -Lipid panel 06/26/2024 with LDL of 57; at goal   -HbA1c, TSH pending  -Cardiology consult    CAD s/p MI in past and PCI of LAD, HLD  -Continue on DAPT-aspirin and Plavix  -Continue statin, lisinopril, metoprolol tartrate    HTN  -Continue lisinopril, metoprolol tartrate    Tobacco abuse  -slowly working on smoking cessation  -was on 1 ppd 2 years ago, currently smoking 0.5 ppd    Morbid obesity  -Severe obesity requiring increased utilization of hospital resources as demonstrated by BMI 44.35    Diet: Cardiac, caffeine free  DVT Prophylaxis: Lovenox SQ  Code Status: Full code     Lotus Phillips PA-C    Dragon dictation software was used to dictate this note and thus there may be minor errors in translation/transcription including garbled speech or misspellings. Please contact for clarification if needed.

## 2024-08-04 NOTE — ED TRIAGE NOTES
Chest pain that started a week ago.  Midsternal and it radiates to his back . Patient describes it as pressure.     Bilateral BP completed   R: 161/90  L: 163/86

## 2024-08-04 NOTE — CARE PLAN
The patient's goals for the shift include      The clinical goals for the shift include      Over the shift, the patient did not make progress toward the following goals.

## 2024-08-04 NOTE — Clinical Note
"Initial BP (!) 80/50 (BP Location: Left arm, Patient Position: Sitting, Cuff Size: Adult Regular)  Pulse 85  Temp 98.2  F (36.8  C) (Tympanic)  Resp 16  Ht 5' 11\" (1.803 m)  Wt 159 lb 3.2 oz (72.2 kg)  Breastfeeding? No  BMI 22.2 kg/m2 Estimated body mass index is 22.2 kg/(m^2) as calculated from the following:    Height as of this encounter: 5' 11\" (1.803 m).    Weight as of this encounter: 159 lb 3.2 oz (72.2 kg). .    Perri Mi, [facility]    " Sheath was removed in the right radial artery. Site closed by TR-Band. 14 ml air in the band

## 2024-08-04 NOTE — PROGRESS NOTES
Gulshan Martinez is a 48 y.o. male admitted for Chest pain, unspecified type. Pharmacy reviewed the patient's [id number] medications and allergies for accuracy.    The list below reflects the PTA list prior to pharmacy medication history. A summary a changes to the PTA medication list has been listed below. Please review each medication in order reconciliation for additional clarification and justification.    Source of information:  T2P  No Changes!  Medications added:    Medications modified:    Medications to be removed:    Medications of concern:      Prior to Admission Medications   Prescriptions Last Dose Informant Patient Reported? Taking?   aspirin 81 mg EC tablet   No No   Sig: Take 1 tablet (81 mg) by mouth once daily.   clopidogrel (Plavix) 75 mg tablet   No No   Sig: Take 1 tablet (75 mg) by mouth once daily.   lisinopril 40 mg tablet   No No   Sig: Take 1 tablet (40 mg) by mouth once daily.   metoprolol tartrate (Lopressor) 25 mg tablet   No No   Sig: Take 1 tablet (25 mg) by mouth 2 times a day.   rosuvastatin (Crestor) 40 mg tablet   No No   Sig: Take 1 tablet (40 mg) by mouth once daily.      Facility-Administered Medications: None       Cecile Godfrey

## 2024-08-05 ENCOUNTER — APPOINTMENT (OUTPATIENT)
Dept: CARDIOLOGY | Facility: HOSPITAL | Age: 49
End: 2024-08-05

## 2024-08-05 LAB
ALBUMIN SERPL BCP-MCNC: 3.7 G/DL (ref 3.4–5)
ALP SERPL-CCNC: 68 U/L (ref 33–120)
ALT SERPL W P-5'-P-CCNC: 30 U/L (ref 10–52)
ANION GAP SERPL CALC-SCNC: 10 MMOL/L (ref 10–20)
AST SERPL W P-5'-P-CCNC: 19 U/L (ref 9–39)
ATRIAL RATE: 66 BPM
BASOPHILS # BLD AUTO: 0.07 X10*3/UL (ref 0–0.1)
BASOPHILS NFR BLD AUTO: 1.1 %
BILIRUB SERPL-MCNC: 0.5 MG/DL (ref 0–1.2)
BODY SURFACE AREA: 2.68 M2
BUN SERPL-MCNC: 11 MG/DL (ref 6–23)
CALCIUM SERPL-MCNC: 8.8 MG/DL (ref 8.6–10.3)
CHLORIDE SERPL-SCNC: 108 MMOL/L (ref 98–107)
CO2 SERPL-SCNC: 23 MMOL/L (ref 21–32)
CREAT SERPL-MCNC: 0.94 MG/DL (ref 0.5–1.3)
EGFRCR SERPLBLD CKD-EPI 2021: >90 ML/MIN/1.73M*2
EJECTION FRACTION APICAL 4 CHAMBER: 58.3
EJECTION FRACTION: 63 %
EOSINOPHIL # BLD AUTO: 0.3 X10*3/UL (ref 0–0.7)
EOSINOPHIL NFR BLD AUTO: 4.7 %
ERYTHROCYTE [DISTWIDTH] IN BLOOD BY AUTOMATED COUNT: 13.3 % (ref 11.5–14.5)
EST. AVERAGE GLUCOSE BLD GHB EST-MCNC: 134 MG/DL
GLUCOSE SERPL-MCNC: 105 MG/DL (ref 74–99)
HBA1C MFR BLD: 6.3 %
HCT VFR BLD AUTO: 46.8 % (ref 41–52)
HGB BLD-MCNC: 16.2 G/DL (ref 13.5–17.5)
IMM GRANULOCYTES # BLD AUTO: 0.02 X10*3/UL (ref 0–0.7)
IMM GRANULOCYTES NFR BLD AUTO: 0.3 % (ref 0–0.9)
LEFT ATRIUM VOLUME AREA LENGTH INDEX BSA: 16.3 ML/M2
LEFT VENTRICLE INTERNAL DIMENSION DIASTOLE: 4.7 CM (ref 3.5–6)
LEFT VENTRICULAR OUTFLOW TRACT DIAMETER: 2 CM
LV EJECTION FRACTION BIPLANE: 74 %
LYMPHOCYTES # BLD AUTO: 1.7 X10*3/UL (ref 1.2–4.8)
LYMPHOCYTES NFR BLD AUTO: 26.6 %
MCH RBC QN AUTO: 30 PG (ref 26–34)
MCHC RBC AUTO-ENTMCNC: 34.6 G/DL (ref 32–36)
MCV RBC AUTO: 87 FL (ref 80–100)
MITRAL VALVE E/A RATIO: 1.43
MITRAL VALVE E/E' RATIO: 7.11
MONOCYTES # BLD AUTO: 0.57 X10*3/UL (ref 0.1–1)
MONOCYTES NFR BLD AUTO: 8.9 %
NEUTROPHILS # BLD AUTO: 3.72 X10*3/UL (ref 1.2–7.7)
NEUTROPHILS NFR BLD AUTO: 58.4 %
NRBC BLD-RTO: 0 /100 WBCS (ref 0–0)
P OFFSET: 173 MS
P ONSET: 123 MS
PLATELET # BLD AUTO: 263 X10*3/UL (ref 150–450)
POTASSIUM SERPL-SCNC: 4 MMOL/L (ref 3.5–5.3)
PR INTERVAL: 194 MS
PROT SERPL-MCNC: 6.4 G/DL (ref 6.4–8.2)
Q ONSET: 220 MS
QRS COUNT: 11 BEATS
QRS DURATION: 86 MS
QT INTERVAL: 410 MS
QTC CALCULATION(BAZETT): 429 MS
QTC FREDERICIA: 423 MS
R AXIS: 220 DEGREES
RBC # BLD AUTO: 5.4 X10*6/UL (ref 4.5–5.9)
RIGHT VENTRICLE FREE WALL PEAK S': 18.3 CM/S
SODIUM SERPL-SCNC: 137 MMOL/L (ref 136–145)
T AXIS: 121 DEGREES
T OFFSET: 425 MS
T4 FREE SERPL-MCNC: 0.92 NG/DL (ref 0.61–1.12)
TRICUSPID ANNULAR PLANE SYSTOLIC EXCURSION: 2.7 CM
TSH SERPL-ACNC: 4.97 MIU/L (ref 0.44–3.98)
VENTRICULAR RATE: 66 BPM
WBC # BLD AUTO: 6.4 X10*3/UL (ref 4.4–11.3)

## 2024-08-05 PROCEDURE — 99152 MOD SED SAME PHYS/QHP 5/>YRS: CPT | Performed by: INTERNAL MEDICINE

## 2024-08-05 PROCEDURE — C1887 CATHETER, GUIDING: HCPCS | Performed by: INTERNAL MEDICINE

## 2024-08-05 PROCEDURE — 36415 COLL VENOUS BLD VENIPUNCTURE: CPT

## 2024-08-05 PROCEDURE — 2500000005 HC RX 250 GENERAL PHARMACY W/O HCPCS: Performed by: INTERNAL MEDICINE

## 2024-08-05 PROCEDURE — 93454 CORONARY ARTERY ANGIO S&I: CPT | Performed by: INTERNAL MEDICINE

## 2024-08-05 PROCEDURE — 84443 ASSAY THYROID STIM HORMONE: CPT

## 2024-08-05 PROCEDURE — 96373 THER/PROPH/DIAG INJ IA: CPT | Performed by: INTERNAL MEDICINE

## 2024-08-05 PROCEDURE — 84439 ASSAY OF FREE THYROXINE: CPT

## 2024-08-05 PROCEDURE — 83036 HEMOGLOBIN GLYCOSYLATED A1C: CPT

## 2024-08-05 PROCEDURE — C1894 INTRO/SHEATH, NON-LASER: HCPCS | Performed by: INTERNAL MEDICINE

## 2024-08-05 PROCEDURE — 2500000001 HC RX 250 WO HCPCS SELF ADMINISTERED DRUGS (ALT 637 FOR MEDICARE OP)

## 2024-08-05 PROCEDURE — 80053 COMPREHEN METABOLIC PANEL: CPT

## 2024-08-05 PROCEDURE — 93010 ELECTROCARDIOGRAM REPORT: CPT | Performed by: INTERNAL MEDICINE

## 2024-08-05 PROCEDURE — C1760 CLOSURE DEV, VASC: HCPCS | Performed by: INTERNAL MEDICINE

## 2024-08-05 PROCEDURE — 85025 COMPLETE CBC W/AUTO DIFF WBC: CPT

## 2024-08-05 PROCEDURE — 99233 SBSQ HOSP IP/OBS HIGH 50: CPT | Performed by: PHYSICIAN ASSISTANT

## 2024-08-05 PROCEDURE — 2500000002 HC RX 250 W HCPCS SELF ADMINISTERED DRUGS (ALT 637 FOR MEDICARE OP, ALT 636 FOR OP/ED): Performed by: INTERNAL MEDICINE

## 2024-08-05 PROCEDURE — 2500000004 HC RX 250 GENERAL PHARMACY W/ HCPCS (ALT 636 FOR OP/ED): Performed by: INTERNAL MEDICINE

## 2024-08-05 PROCEDURE — G0378 HOSPITAL OBSERVATION PER HR: HCPCS

## 2024-08-05 PROCEDURE — 93306 TTE W/DOPPLER COMPLETE: CPT

## 2024-08-05 PROCEDURE — 93005 ELECTROCARDIOGRAM TRACING: CPT

## 2024-08-05 PROCEDURE — 2500000004 HC RX 250 GENERAL PHARMACY W/ HCPCS (ALT 636 FOR OP/ED)

## 2024-08-05 PROCEDURE — 93306 TTE W/DOPPLER COMPLETE: CPT | Performed by: INTERNAL MEDICINE

## 2024-08-05 PROCEDURE — 2550000001 HC RX 255 CONTRASTS: Performed by: INTERNAL MEDICINE

## 2024-08-05 PROCEDURE — 99222 1ST HOSP IP/OBS MODERATE 55: CPT | Performed by: INTERNAL MEDICINE

## 2024-08-05 PROCEDURE — 2500000001 HC RX 250 WO HCPCS SELF ADMINISTERED DRUGS (ALT 637 FOR MEDICARE OP): Performed by: INTERNAL MEDICINE

## 2024-08-05 PROCEDURE — 2500000001 HC RX 250 WO HCPCS SELF ADMINISTERED DRUGS (ALT 637 FOR MEDICARE OP): Performed by: STUDENT IN AN ORGANIZED HEALTH CARE EDUCATION/TRAINING PROGRAM

## 2024-08-05 PROCEDURE — 2720000007 HC OR 272 NO HCPCS: Performed by: INTERNAL MEDICINE

## 2024-08-05 RX ORDER — MIDAZOLAM HYDROCHLORIDE 1 MG/ML
INJECTION, SOLUTION INTRAMUSCULAR; INTRAVENOUS AS NEEDED
Status: DISCONTINUED | OUTPATIENT
Start: 2024-08-05 | End: 2024-08-05 | Stop reason: HOSPADM

## 2024-08-05 RX ORDER — SODIUM CHLORIDE 9 MG/ML
75 INJECTION, SOLUTION INTRAVENOUS CONTINUOUS
Status: ACTIVE | OUTPATIENT
Start: 2024-08-05 | End: 2024-08-06

## 2024-08-05 RX ORDER — SODIUM CHLORIDE 9 MG/ML
75 INJECTION, SOLUTION INTRAVENOUS CONTINUOUS
Status: CANCELLED | OUTPATIENT
Start: 2024-08-05

## 2024-08-05 RX ORDER — FENTANYL CITRATE 50 UG/ML
INJECTION, SOLUTION INTRAMUSCULAR; INTRAVENOUS AS NEEDED
Status: DISCONTINUED | OUTPATIENT
Start: 2024-08-05 | End: 2024-08-05 | Stop reason: HOSPADM

## 2024-08-05 RX ORDER — HEPARIN SODIUM 1000 [USP'U]/ML
INJECTION, SOLUTION INTRAVENOUS; SUBCUTANEOUS AS NEEDED
Status: DISCONTINUED | OUTPATIENT
Start: 2024-08-05 | End: 2024-08-05 | Stop reason: HOSPADM

## 2024-08-05 RX ORDER — LIDOCAINE HYDROCHLORIDE 20 MG/ML
INJECTION, SOLUTION INFILTRATION; PERINEURAL AS NEEDED
Status: DISCONTINUED | OUTPATIENT
Start: 2024-08-05 | End: 2024-08-05 | Stop reason: HOSPADM

## 2024-08-05 RX ADMIN — ASPIRIN 81 MG: 81 TABLET, COATED ORAL at 09:10

## 2024-08-05 RX ADMIN — METOPROLOL TARTRATE 25 MG: 25 TABLET, FILM COATED ORAL at 20:32

## 2024-08-05 RX ADMIN — ROSUVASTATIN 40 MG: 20 TABLET, FILM COATED ORAL at 20:32

## 2024-08-05 RX ADMIN — PANTOPRAZOLE SODIUM 40 MG: 40 TABLET, DELAYED RELEASE ORAL at 09:10

## 2024-08-05 RX ADMIN — LISINOPRIL 40 MG: 20 TABLET ORAL at 20:32

## 2024-08-05 RX ADMIN — CLOPIDOGREL 75 MG: 75 TABLET ORAL at 09:10

## 2024-08-05 RX ADMIN — HUMAN ALBUMIN MICROSPHERES AND PERFLUTREN 0.5 ML: 10; .22 INJECTION, SOLUTION INTRAVENOUS at 08:33

## 2024-08-05 ASSESSMENT — ENCOUNTER SYMPTOMS
NUMBNESS: 0
COUGH: 0
FATIGUE: 1
WEAKNESS: 0
BRUISES/BLEEDS EASILY: 0
PALPITATIONS: 0
TROUBLE SWALLOWING: 0
SHORTNESS OF BREATH: 0
CHILLS: 0
SORE THROAT: 0
DIZZINESS: 0
APPETITE CHANGE: 0
WHEEZING: 0
EYE PAIN: 0
DIARRHEA: 0
ABDOMINAL PAIN: 0
HEADACHES: 0
BLOOD IN STOOL: 0
HEMATURIA: 0
FEVER: 0
DIAPHORESIS: 0
LIGHT-HEADEDNESS: 0
FREQUENCY: 0
FACIAL SWELLING: 0
WOUND: 0
BACK PAIN: 0
CHEST TIGHTNESS: 0
NAUSEA: 0
JOINT SWELLING: 0
FLANK PAIN: 0
HALLUCINATIONS: 0
CONSTIPATION: 0
DYSURIA: 0
VOMITING: 0

## 2024-08-05 ASSESSMENT — PAIN SCALES - GENERAL
PAINLEVEL_OUTOF10: 0 - NO PAIN
PAINLEVEL_OUTOF10: 2
PAINLEVEL_OUTOF10: 0 - NO PAIN
PAINLEVEL_OUTOF10: 1
PAINLEVEL_OUTOF10: 0 - NO PAIN

## 2024-08-05 ASSESSMENT — PAIN - FUNCTIONAL ASSESSMENT
PAIN_FUNCTIONAL_ASSESSMENT: 0-10

## 2024-08-05 NOTE — PROGRESS NOTES
RUTH notified by [facility], pt listed as self pay. SW sent pt information to [facility] for medicaid screening. SW to follow.    JL Rod ([id number])   Care Transitions    Social work consult placed for positive medical risk screen for etoh. SW reviewed pt's chart and communicated with [facility]. SW met with pt to assess needs and provide support, introduced self and my role as  with care transition team. Pt stated he has tried applying for medicaid in past and is over income. Explored pt's thoughts toward drinking behaviors, awareness, and willingness for tx. Pt did not identify any concerns related to drinking and expressed frustrations as he is waiting for a procedure to be done and is not able to eat or drink at this time. No other SW needs foreseen at this time. SW signing off; available upon request.    [id number]  SW received email from [facility]. RUTH provided document to pt for signature, left original copy with pt and scanned to email copy for HRS. No other needs identified at this time, SW signing off,  pt and care team aware of SW availability while inpt.    JL Rod ([id number])   Care Transitions

## 2024-08-05 NOTE — NURSING NOTE
With sleep, patient continues to have brief episodes with HR in high 40's.  Patient is in no distress, resp even and unlabored.

## 2024-08-05 NOTE — POST-PROCEDURE NOTE
Physician Transition of Care Summary  Invasive Cardiovascular Lab    Procedure Date: 8/5/2024  Attending:    * Ambrose Worthy - Primary  Resident/Fellow/Other Assistant: Surgeons and Role:  * No surgeons found with a matching role *    Indications:   Pre-op Diagnosis      * Chest pain, unspecified type [R07.9]     * S/P PTCA (percutaneous transluminal coronary angioplasty) [Z98.61]    Post-procedure diagnosis:   Post-op Diagnosis     * Chest pain, unspecified type [R07.9]     * S/P PTCA (percutaneous transluminal coronary angioplasty) [Z98.61]    Procedure(s):   Left Heart Cath  [id number] - [place] CATH PLMT L HRT & ARTS W/NJX & ANGIO IMG S&I        Procedure Findings:   Mild CAD    Description of the Procedure:   Coronary angio    Complications:   None    Stents/Implants:   Implants       No implant documentation for this case.            Anticoagulation/Antiplatelet Plan:   Heparin    Estimated Blood Loss:   * No values recorded between 8/5/2024  2:20 PM and 8/5/2024  3:00 PM *    Anesthesia: Moderate Sedation Anesthesia Staff: No anesthesia staff entered.    Any Specimen(s) Removed:   No specimens collected during this procedure.    Disposition:   SDU      Electronically signed by: Ambrose Worthy MD, 8/5/2024 3:00 PM

## 2024-08-05 NOTE — PROGRESS NOTES
Gulshan Martinez is a 48 y.o. male on day 0 of admission presenting with Chest pain, unspecified type.      Subjective   Gulshan Martinez is a 48 y.o. male with PMHx s/f CAD with h/o STEMI s/p POBA to D2 and LOLI to mid LAD 06/24/2021, HTN, hyperlipidemia and tobacco abuse presenting with chest pain. On 07/29/2024, the patient presented to the ED in [place] with chest pain with negative workup. He was told he was not having a MI or stroke. He continues to have intermittent chest pain throughout last week and met with his cardiologist Dr. Worthy on 08/02/24, who told him to come to ED if he's continuing to have worsening recurrent chest pain. Today, he notes that his chest pain started around 9am while at rest and has been ongoing, rating 7/10 pain, midsternal with radiation to left arm and back as well as pain across the chest along the diaphragmatic area. Per chart review, his previously ordered stress test and echocardiogram was not performed as the patient does not have insurance. His dad had MI with unknown age when that occurred, HTN and mom with diabetes. He smokes 0.5 ppd for the past 2 yrs, used to smoke 1 ppd for 30 yrs. HSTI negative. CXR - Marked tortuous aorta. Aneurysm or dissection can not be excluded. EKG; sinus rhythm at 70 bpm. A1c 6.7%. TSH elevated 4.97, Free T4 WNL    8/5/2024: No acute events overnight. Vitals stable, /67. Labs largely unremarkable. Seen by Dr. Worthy, planning [facility]. Echo done and pending read         Review of Systems   Constitutional:  Positive for fatigue. Negative for appetite change, chills, diaphoresis and fever.   HENT:  Negative for congestion, ear pain, facial swelling, hearing loss, nosebleeds, sore throat, tinnitus and trouble swallowing.    Eyes:  Negative for pain.   Respiratory:  Negative for cough, chest tightness, shortness of breath and wheezing.    Cardiovascular:  Positive for chest pain. Negative for palpitations and leg swelling.   Gastrointestinal:   Negative for abdominal pain, blood in stool, constipation, diarrhea, nausea and vomiting.   Genitourinary:  Negative for dysuria, flank pain, frequency, hematuria and urgency.   Musculoskeletal:  Negative for back pain and joint swelling.   Skin:  Negative for rash and wound.   Neurological:  Negative for dizziness, syncope, weakness, light-headedness, numbness and headaches.   Hematological:  Does not bruise/bleed easily.   Psychiatric/Behavioral:  Negative for behavioral problems, hallucinations and suicidal ideas.           Objective     Last Recorded Vitals  /69 (BP Location: Right arm, Patient Position: Lying)   Pulse 68   Temp 36.4 °C (97.5 °F) (Temporal)   Resp 19   Wt 144 kg (316 lb 9.3 oz)   SpO2 96%     Image Results  XR chest 1 view    Result Date: 8/4/2024  Interpreted By:  Alen Corbin, STUDY: XR CHEST 1 VIEW;; 8/4/2024 1:03 pm   INDICATION: Signs/Symptoms:Chest Pain.   COMPARISON: 05/04/2010   ACCESSION NUMBER(S): [id number]   ORDERING CLINICIAN: AUSTYN CM   FINDINGS: Markedly tortuous and possibly ectatic aorta although evaluation is limited to portable technique and positioning. The cardiac silhouette is stable in size. Lungs are clear bilaterally.       1. Marked tortuous aorta. Aneurysm or dissection can not be excluded. If there is such clinical concern or concern for acute pulmonary embolic disease, further evaluation with chest CT is recommended for better assessment.   2. No consolidations, effusions, infiltrates or pneumothorax.       Signed by: Alen Corbin 8/4/2024 1:07 PM Dictation workstation:   [id number]    ECG 12 lead (Clinic Performed)    Result Date: 8/2/2024  See scanned image     CT angio abdomen pelvis w and or wo IV IV contrast    Result Date: 7/29/2024  * * *Final Report* * * DATE OF EXAM: Jul 29 2024 12:35PM   ARIA   0134  -  CTA ABD/PELV W IVCON  / ACCESSION #  [id number] PROCEDURE REASON: Aortic dissection suspected      * * * * Physician Interpretation  * * * *  EXAMINATION:  CT ANGIOGRAPHY OF THE CHEST, ABDOMEN AND PELVIS WITH IV CONTRAST CLINICAL INDICATION:   Aortic dissection suspected. TECHNIQUE: CTA of the thorax before and after intravenous contrast administration.   Multiplanar image reformatted and MIP images were obtained and reviewed. Arterial phase CT scans were obtained from the dome of the diaphragm through the proximal femora.  MIP reconstructed images of the arteries are also submitted for interpretation, obtained from the post contrast exam. IV Contrast:  140 ml of Omnipaque 350 CT Dose-Length Product:  3457 mGy*cm CT Dose Reduction Employed:  Automated exposure control(AEC) and iterative recon COMPARISON:  No relevant prior studies available. RESULT: --Chest-- Aorta:  Three-vessel left-sided aortic arch. The thoracic aorta is normal in caliber.  No CT evidence of acute aortic pathology such as dissection, penetrating atherosclerotic ulcer, or contained rupture.  Visualized portions of the arch branch vessels are patent and free of significant stenosis. Pulmonary Arteries: Normal in caliber. No gross filling defects. However, study not tailored for PE evaluation. Heart/Pericardium/Venous Return: Coronary artery calcifications present.. Stenosis cannot be evaluated, the study was not tailored for coronary artery evaluation. Heart is normal in size.  Unremarkable CT appearance of the pericardium. Lines/Tubes: None. Visualized Lower Neck: Unremarkable. Mediastinum: Top normal 1 cm right paratracheal lymph node. Lungs, Airways And Pleura: Unremarkable. Bones/Soft Tissues: Degenerative changes. --Abdomen and Pelvis-- Liver: Multiple hepatic cysts measuring up to 1.6 cm. Gallbladder: No CT evident gallstones. Biliary system: No biliary ductal dilatation. Pancreas: Normal. Spleen: Subcentimeter hypodense too small to characterize superior splenic lesion. Adrenal glands: Normal. Kidneys and ureters: 1 cm left renal exophytic cyst.  Couple of subcentimeter  hypodensities too small to characterize renal lesions.   Symmetric nephrograms with no evidence of hydronephrosis. GI tract: No dilated loops of small or large bowel.  A few scattered uncomplicated colonic diverticuli. Peritoneum/Retroperitoneum: No ascites or free air. No fluid collections. Lymph nodes: No enlarged retroperitoneal, mesenteric or pelvic lymph nodes. Pelvis: No mass, ascites or loculated fluid collection. Bones/Soft Tissues: Degenerative changes. Arterial examination: *Aorta: Patent with scattered atherosclerotic calcifications. *Celiac: Patent *SMA: Patent. *ROBE: Patent. *Renal: Patent *Iliac arteries: Patent with scattered atherosclerotic calcifications.    IMPRESSION: 1.   No CT evidence of acute aortic pathology. 2.  No CT evidence of acute abnormality in the chest, abdomen or pelvis. 3.  Incidental findings as described. : [facility]   Transcribe Date/Time: Jul 29 2024 12:46P Dictated by : HARIS BARRAGAN MD This examination was interpreted and the report reviewed and electronically signed by: HARIS BARRAGAN MD on Jul 29 2024 12:55PM  EST    CT angio chest w and wo IV contrast    Result Date: 7/29/2024  * * *Final Report* * * DATE OF EXAM: Jul 29 2024 12:35PM   ARIA   0126  -  CTA CHEST (GATED) WO/W IVCON  / ACCESSION #  [id number] PROCEDURE REASON: Aortic dissection suspected      * * * * Physician Interpretation * * * *  EXAMINATION:  CT ANGIOGRAPHY OF THE CHEST, ABDOMEN AND PELVIS WITH IV CONTRAST CLINICAL INDICATION:   Aortic dissection suspected. TECHNIQUE: CTA of the thorax before and after intravenous contrast administration.   Multiplanar image reformatted and MIP images were obtained and reviewed. Arterial phase CT scans were obtained from the dome of the diaphragm through the proximal femora.  MIP reconstructed images of the arteries are also submitted for interpretation, obtained from the post contrast exam.  IV Contrast:  140 ml of Omnipaque 350 CT Dose-Length Product:  3457  mGy*cm CT Dose Reduction Employed:  Automated exposure control(AEC) and iterative recon COMPARISON:  No relevant prior studies available. RESULT: --Chest-- Aorta:  Three-vessel left-sided aortic arch. The thoracic aorta is normal in caliber.  No CT evidence of acute aortic pathology such as dissection, penetrating atherosclerotic ulcer, or contained rupture.  Visualized portions of the arch branch vessels are patent and free of significant stenosis. Pulmonary Arteries: Normal in caliber. No gross filling defects. However, study not tailored for PE evaluation. Heart/Pericardium/Venous Return: Coronary artery calcifications present.. Stenosis cannot be evaluated, the study was not tailored for coronary artery evaluation. Heart is normal in size.  Unremarkable CT appearance of the pericardium. Lines/Tubes: None. Visualized Lower Neck: Unremarkable. Mediastinum: Top normal 1 cm right paratracheal lymph node. Lungs, Airways And Pleura: Unremarkable. Bones/Soft Tissues: Degenerative changes. --Abdomen and Pelvis-- Liver: Multiple hepatic cysts measuring up to 1.6 cm. Gallbladder: No CT evident gallstones. Biliary system: No biliary ductal dilatation. Pancreas: Normal. Spleen: Subcentimeter hypodense too small to characterize superior splenic lesion. Adrenal glands: Normal. Kidneys and ureters: 1 cm left renal exophytic cyst.  Couple of subcentimeter hypodensities too small to characterize renal lesions.   Symmetric nephrograms with no evidence of hydronephrosis. GI tract: No dilated loops of small or large bowel.  A few scattered uncomplicated colonic diverticuli. Peritoneum/Retroperitoneum: No ascites or free air. No fluid collections. Lymph nodes: No enlarged retroperitoneal, mesenteric or pelvic lymph nodes. Pelvis: No mass, ascites or loculated fluid collection. Bones/Soft Tissues: Degenerative changes. Arterial examination:  *Aorta: Patent with scattered atherosclerotic calcifications. *Celiac: Patent *SMA: Patent.  *ROBE: Patent. *Renal: Patent *Iliac arteries: Patent with scattered atherosclerotic calcifications.    IMPRESSION: 1.   No CT evidence of acute aortic pathology. 2.  No CT evidence of acute abnormality in the chest, abdomen or pelvis. 3.  Incidental findings as described. : [facility]   Transcribe Date/Time: Jul 29 2024 12:46P Dictated by : HARIS BARARGAN MD This examination was interpreted and the report reviewed and electronically signed by: HARIS BARRAGAN MD on Jul 29 2024 12:55PM  EST    CT head wo IV contrast    Result Date: 7/29/2024  * * *Final Report* * * DATE OF EXAM: Jul 29 2024 12:35PM   ARIA   0504  -  CT BRAIN WO IVCON   / ACCESSION #  [id number] PROCEDURE REASON: Headache, sudden, severe      * * * * Physician Interpretation * * * *  EXAMINATION: CT BRAIN WO IVCON CLINICAL HISTORY: Headache, sudden, severe TECHNIQUE:  Serial axial images without IV contrast were obtained from the vertex to the foramen magnum. MQ:  CTBWO_3 CT Radiation dose: Integrated Dose-Length Product (DLP) for this visit =   3457  mGy*cm CT Dose Reduction Employed: Automated exposure control(AEC) and iterative recon COMPARISON: None. RESULT: Post-operative change: None. Acute change: No evidence of an acute infarct or other acute parenchymal process. Hemorrhage: No evidence of acute intracranial hemorrhage. ECASS hemorrhagic transformation score: Not Applicable Mass Lesion / Mass Effect: There is no evidence of an intracranial mass or extraaxial fluid collection.  No significant mass effect. Chronic change: None apparent. Parenchyma: There is no significant volume loss.  The brain parenchyma is otherwise within normal limits for age. Ventricles: The ventricles are within normal limits of size and configuration for age. Paranasal sinuses and skull base: Mild LEFT frontal mucosal thickening. Partial opacification ethmoid air cells. 7 mm ovoid density RIGHT sphenoid sinus.   The skull base and imaged soft tissues are  unremarkable. Localizer images: No additional findings.    IMPRESSION: No acute intracranial finding. Sinus disease as noted in results : [facility]   Transcribe Date/Time: Jul 29 2024 12:47P Dictated by : MITCHELL MOLINA MD This examination was interpreted and the report reviewed and electronically signed by: MITCHELL MOLINA MD on Jul 29 2024 12:51PM  EST    XR chest 1 view    Result Date: 7/29/2024  * * *Final Report* * * DATE OF EXAM: Jul 29 2024 12:41PM   LUX   5376  -  XR CHEST 1V FRONTAL PORT  / ACCESSION #  [id number] PROCEDURE REASON: Chest pain      * * * * Physician Interpretation * * * *  EXAMINATION:  CHEST RADIOGRAPH (PORTABLE SINGLE VIEW AP) Exam Date/Time:  7/29/2024 12:41 PM CLINICAL HISTORY: Chest pain MQ:  XCPR_5 Comparison:  No relevant prior study available RESULT: Lines, tubes, and devices:  None. Lungs and pleura:  No lung mass, consolidation, pulmonary edema or pleural fluid. Cardiomediastinal silhouette:  Tortuous thoracic aorta. Other:  .    IMPRESSION: No acute chest process radiographically. : [facility]   Transcribe Date/Time: Jul 29 2024 12:48P Dictated by : BRETT ELIAS MD This examination was interpreted and the report reviewed and electronically signed by: BRETT ELIAS MD on Jul 29 2024 12:49PM  EST       Lab Results  Results for orders placed or performed during the hospital encounter of 08/04/24 (from the past 24 hour(s))   CBC and Auto Differential   Result Value Ref Range    WBC 8.6 4.4 - 11.3 x10*3/uL    nRBC 0.0 0.0 - 0.0 /100 WBCs    RBC 5.79 4.50 - 5.90 x10*6/uL    Hemoglobin 17.7 (H) 13.5 - 17.5 g/dL    Hematocrit 49.6 41.0 - 52.0 %    MCV 86 80 - 100 fL    MCH 30.6 26.0 - 34.0 pg    MCHC 35.7 32.0 - 36.0 g/dL    RDW 13.2 11.5 - 14.5 %    Platelets 278 150 - 450 x10*3/uL    Neutrophils % 61.6 40.0 - 80.0 %    Immature Granulocytes %, Automated 0.3 0.0 - 0.9 %    Lymphocytes % 26.5 13.0 - 44.0 %    Monocytes % 7.3 2.0 - 10.0 %    Eosinophils % 3.4 0.0 - 6.0 %    Basophils %  0.9 0.0 - 2.0 %    Neutrophils Absolute 5.30 1.20 - 7.70 x10*3/uL    Immature Granulocytes Absolute, Automated 0.03 0.00 - 0.70 x10*3/uL    Lymphocytes Absolute 2.28 1.20 - 4.80 x10*3/uL    Monocytes Absolute 0.63 0.10 - 1.00 x10*3/uL    Eosinophils Absolute 0.29 0.00 - 0.70 x10*3/uL    Basophils Absolute 0.08 0.00 - 0.10 x10*3/uL   Comprehensive Metabolic Panel   Result Value Ref Range    Glucose 99 74 - 99 mg/dL    Sodium 138 136 - 145 mmol/L    Potassium 4.6 3.5 - 5.3 mmol/L    Chloride 106 98 - 107 mmol/L    Bicarbonate 25 21 - 32 mmol/L    Anion Gap 12 10 - 20 mmol/L    Urea Nitrogen 11 6 - 23 mg/dL    Creatinine 0.97 0.50 - 1.30 mg/dL    eGFR >90 >60 mL/min/1.73m*2    Calcium 9.2 8.6 - 10.3 mg/dL    Albumin 4.1 3.4 - 5.0 g/dL    Alkaline Phosphatase 78 33 - 120 U/L    Total Protein 7.0 6.4 - 8.2 g/dL    AST 25 9 - 39 U/L    Bilirubin, Total 0.6 0.0 - 1.2 mg/dL    ALT 37 10 - 52 U/L   Magnesium   Result Value Ref Range    Magnesium 2.00 1.60 - 2.40 mg/dL   Troponin I, High Sensitivity, Initial   Result Value Ref Range    Troponin I, High Sensitivity 5 0 - 20 ng/L   Troponin, High Sensitivity, 1 Hour   Result Value Ref Range    Troponin I, High Sensitivity 5 0 - 20 ng/L   CBC and Auto Differential   Result Value Ref Range    WBC 6.4 4.4 - 11.3 x10*3/uL    nRBC 0.0 0.0 - 0.0 /100 WBCs    RBC 5.40 4.50 - 5.90 x10*6/uL    Hemoglobin 16.2 13.5 - 17.5 g/dL    Hematocrit 46.8 41.0 - 52.0 %    MCV 87 80 - 100 fL    MCH 30.0 26.0 - 34.0 pg    MCHC 34.6 32.0 - 36.0 g/dL    RDW 13.3 11.5 - 14.5 %    Platelets 263 150 - 450 x10*3/uL    Neutrophils % 58.4 40.0 - 80.0 %    Immature Granulocytes %, Automated 0.3 0.0 - 0.9 %    Lymphocytes % 26.6 13.0 - 44.0 %    Monocytes % 8.9 2.0 - 10.0 %    Eosinophils % 4.7 0.0 - 6.0 %    Basophils % 1.1 0.0 - 2.0 %    Neutrophils Absolute 3.72 1.20 - 7.70 x10*3/uL    Immature Granulocytes Absolute, Automated 0.02 0.00 - 0.70 x10*3/uL    Lymphocytes Absolute 1.70 1.20 - 4.80 x10*3/uL     Monocytes Absolute 0.57 0.10 - 1.00 x10*3/uL    Eosinophils Absolute 0.30 0.00 - 0.70 x10*3/uL    Basophils Absolute 0.07 0.00 - 0.10 x10*3/uL   Comprehensive Metabolic Panel   Result Value Ref Range    Glucose 105 (H) 74 - 99 mg/dL    Sodium 137 136 - 145 mmol/L    Potassium 4.0 3.5 - 5.3 mmol/L    Chloride 108 (H) 98 - 107 mmol/L    Bicarbonate 23 21 - 32 mmol/L    Anion Gap 10 10 - 20 mmol/L    Urea Nitrogen 11 6 - 23 mg/dL    Creatinine 0.94 0.50 - 1.30 mg/dL    eGFR >90 >60 mL/min/1.73m*2    Calcium 8.8 8.6 - 10.3 mg/dL    Albumin 3.7 3.4 - 5.0 g/dL    Alkaline Phosphatase 68 33 - 120 U/L    Total Protein 6.4 6.4 - 8.2 g/dL    AST 19 9 - 39 U/L    Bilirubin, Total 0.5 0.0 - 1.2 mg/dL    ALT 30 10 - 52 U/L   Hemoglobin A1C   Result Value Ref Range    Hemoglobin A1C 6.3 (H) see below %    Estimated Average Glucose 134 Not Established mg/dL   TSH with reflex to Free T4 if abnormal   Result Value Ref Range    Thyroid Stimulating Hormone 4.97 (H) 0.44 - 3.98 mIU/L   Thyroxine, Free   Result Value Ref Range    Thyroxine, Free 0.92 0.61 - 1.12 ng/dL   Transthoracic Echo (TTE) Complete   Result Value Ref Range    BSA 2.68 m2        Medications  Scheduled medications:  aspirin, 81 mg, oral, Daily  clopidogrel, 75 mg, oral, Daily  enoxaparin, 40 mg, subcutaneous, q12h TAISHA  lisinopril, 40 mg, oral, Daily  metoprolol tartrate, 25 mg, oral, BID  nitroglycerin, 0.5 inch, transdermal, q6h during day  pantoprazole, 40 mg, oral, Daily before breakfast   Or  pantoprazole, 40 mg, intravenous, Daily before breakfast  polyethylene glycol, 17 g, oral, Daily  rosuvastatin, 40 mg, oral, Daily      Continuous medications:     PRN medications:  PRN medications: acetaminophen, bisacodyl, bisacodyl, guaiFENesin, melatonin, ondansetron     Physical Exam  Constitutional:       General: He is not in acute distress.     Appearance: Normal appearance. He is obese.      Comments: Laying nearly flat in bed on his right side, does awaken for  questioning but mostly keeps his eyes closed   HENT:      Head: Normocephalic and atraumatic.      Right Ear: External ear normal.      Left Ear: External ear normal.      Nose: Nose normal.      Mouth/Throat:      Mouth: Mucous membranes are moist.      Pharynx: Oropharynx is clear.   Eyes:      Extraocular Movements: Extraocular movements intact.      Conjunctiva/sclera: Conjunctivae normal.      Pupils: Pupils are equal, round, and reactive to light.   Cardiovascular:      Rate and Rhythm: Normal rate and regular rhythm.      Pulses: Normal pulses.      Heart sounds: Normal heart sounds.   Pulmonary:      Effort: Pulmonary effort is normal. No respiratory distress.      Breath sounds: Normal breath sounds. No wheezing, rhonchi or rales.   Abdominal:      General: Bowel sounds are normal.      Palpations: Abdomen is soft.      Tenderness: There is no abdominal tenderness. There is no right CVA tenderness, left CVA tenderness, guarding or rebound.   Musculoskeletal:         General: No swelling. Normal range of motion.      Cervical back: Normal range of motion and neck supple.   Skin:     General: Skin is warm and dry.      Capillary Refill: Capillary refill takes less than 2 seconds.      Findings: No lesion or rash.   Neurological:      General: No focal deficit present.      Mental Status: He is alert and oriented to person, place, and time. Mental status is at baseline.   Psychiatric:         Mood and Affect: Mood normal.         Behavior: Behavior normal.                  Code Status  Full Code     Assessment/Plan   This patient currently has cardiac telemetry ordered; if you would like to modify or discontinue the telemetry order, click here to go to the orders activity to modify/discontinue the order.  Unstable angina in a patient with hx of CAD s/p PCI to LAD  Monitor on telemetry  Cardiology consult  EKG nonischemic  HST negative x 2  Lipid panel from 6/26/2024 reviewed  Echocardiogram  A1c 6.7%  Seen by  cardiology and planning left heart catheterization 8/5/2024  Continue aspirin/Plavix  Continue high intensity statin  Continue beta-blocker  Nitro paste    HTN  Continue home medications  Monitor BP and adjust as necessary    Tobacco use  Smoking cessation education >3 minutes provided  Was smoking 1 pack/day 2 years ago, currently has cut back to 1/2 pack/day    Severe obesity BMI 44.15  Severe obesity requiring increased utilization of hospital resources as demonstrated by BMI     DVT ppx: Lovenox       Please see orders for more complete plan    Vesta Angelo PA-C

## 2024-08-05 NOTE — CONSULTS
Inpatient consult to Cardiology  Consult performed by: Ambrose Worthy MD  Consult ordered by: Lotus Phillips PA-C        History Of Present Illness:    Gulshan Martinez is a 48 y.o. male with PMHx s/f CAD with h/o STEMI s/p POBA to D2 and LOLI to mid LAD 06/24/2021, HTN, hyperlipidemia and tobacco abuse presenting with chest pain. On 07/29/2024, the patient presented to the ED in [place] with chest pain with negative workup. He was told he was not having a MI or stroke. He continues to have intermittent chest pain throughout last week and met with his cardiologist Dr. Worthy on 08/02/24, who told him to come to ED if he's continuing to have worsening recurrent chest pain. Today, he notes that his chest pain started around 9am while at rest and has been ongoing, rating 7/10 pain, midsternal with radiation to left arm and back as well as pain across the chest along the diaphragmatic area. Per chart review, his previously ordered stress test and echocardiogram was not performed as the patient does not have insurance. His dad had MI with unknown age when that occurred, HTN and mom with diabetes. He smokes 0.5 ppd for the past 2 yrs, used to smoke 1 ppd for 30 yrs. Denies diaphoresis, headache, dizziness, n/v, palpitations, pleuritic pain, shortness of breath, abdominal pain, leg swelling, syncope, changes in urine and bowel symptoms.      Last Recorded Vitals:  Vitals:    08/04/24 1956 08/04/24 2350 08/05/24 0034 08/05/24 0430   BP: 144/69 111/70 120/78 105/67   BP Location: Left arm Left arm Left arm Left arm   Patient Position: Lying Lying Lying Lying   Pulse: 76 68 66 65   Resp: 16 18 19 20   Temp: 36.8 °C (98.2 °F) 36.4 °C (97.5 °F) 36.1 °C (97 °F) 35.9 °C (96.7 °F)   TempSrc: Temporal Temporal Temporal Temporal   SpO2: 98% 94% 95% 94%   Weight:    144 kg (316 lb 9.3 oz)   Height:           Last Labs:  CBC - 8/5/2024:  6:08 AM  6.4 16.2 263    46.8      CMP - 8/5/2024:  6:08 AM  8.8 6.4 19 --- 0.5   _ 3.7 30 68      PTT -  "No results in last year.  _   _ _     Troponin I, High Sensitivity   Date/Time Value Ref Range Status   08/04/2024 01:40 PM 5 0 - 20 ng/L Final   08/04/2024 12:25 PM 5 0 - 20 ng/L Final     Hemoglobin A1C   Date/Time Value Ref Range Status   08/05/2024 06:08 AM 6.3 (H) see below % Final     LDL Calculated   Date/Time Value Ref Range Status   06/26/2024 08:31 AM 57 <=99 mg/dL Final     Comment:                                 Near   Borderline      AGE      Desirable  Optimal    High     High     Very High     0-19 Y     0 - 109     ---    110-129   >/= 130     ----    20-24 Y     0 - 119     ---    120-159   >/= 160     ----      >24 Y     0 -  99   100-129  130-159   160-189     >/=190       VLDL   Date/Time Value Ref Range Status   06/26/2024 08:31 AM 18 0 - 40 mg/dL Final   03/28/2022 08:47 AM 21 0 - 40 mg/dL Final   06/25/2021 06:48 AM 27 0 - 40 mg/dL Final      Last I/O:  No intake/output data recorded.    Past Cardiology Tests (Last 3 Years):  EKG:  ECG 12 lead (Clinic Performed) 08/02/2024      ECG 12 Lead 06/26/2024      ECG 12 lead (Clinic Performed) 12/04/2023    Echo:  No results found for this or any previous visit from the past 1095 days.    Ejection Fractions:  No results found for: \"EF\"  Cath:  No results found for this or any previous visit from the past 1095 days.    Stress Test:  No results found for this or any previous visit from the past 1095 days.    Cardiac Imaging:  No results found for this or any previous visit from the past 1095 days.      Past Medical History:  He has a past medical history of Non-ST elevation (NSTEMI) myocardial infarction (Multi).    Past Surgical History:  He has a past surgical history that includes Other surgical history (06/25/2021).      Social History:  He reports that he has been smoking cigarettes. He has a 30 pack-year smoking history. He has never used smokeless tobacco. He reports current alcohol use of about 2.0 standard drinks of alcohol per week. He " reports that he does not use drugs.    Family History:  No family history on file.     Allergies:  Patient has no known allergies.    Inpatient Medications:  Scheduled medications   Medication Dose Route Frequency    aspirin  81 mg oral Daily    clopidogrel  75 mg oral Daily    enoxaparin  40 mg subcutaneous q12h TAISHA    lisinopril  40 mg oral Daily    metoprolol tartrate  25 mg oral BID    nitroglycerin  0.5 inch transdermal q6h during day    pantoprazole  40 mg oral Daily before breakfast    Or    pantoprazole  40 mg intravenous Daily before breakfast    perflutren protein A microsphere  0.5 mL intravenous Once in imaging    polyethylene glycol  17 g oral Daily    rosuvastatin  40 mg oral Daily     PRN medications   Medication    acetaminophen    bisacodyl    bisacodyl    guaiFENesin    melatonin    ondansetron     Continuous Medications   Medication Dose Last Rate     Outpatient Medications:  Current Outpatient Medications   Medication Instructions    aspirin 81 mg, oral, Daily    clopidogrel (PLAVIX) 75 mg, oral, Daily    lisinopril 40 mg, oral, Daily    metoprolol tartrate (LOPRESSOR) 25 mg, oral, 2 times daily    rosuvastatin (CRESTOR) 40 mg, oral, Daily       Physical Exam:  Constitutional: Pleasant and cooperative. Laying in bed in acute distress. Conversant.   Skin: Warm and dry; no obvious lesions, rashes, pallor, or jaundice. Good turgor.   Eyes: EOMI. Anicteric sclera.   ENT: Mucous membranes moist; no obvious injury or deformity appreciated.   Head and Neck: Normocephalic, atraumatic. ROM preserved. Trachea midline. No appreciable JVD.   Respiratory: Nonlabored on RA. Lungs clear to auscultation bilaterally without obvious adventitious sounds. Chest rise is equal.  Cardiovascular: RRR. No gross murmur, gallop, or rub. Extremities are warm and well-perfused with good capillary refill (< 3 seconds). Left chest wall tenderness.  GI: Abdomen soft, nondistended, tender epigastric and RUQ. No obvious  organomegaly appreciated. Bowel sounds are present and normoactive.  : No CVA tenderness.   MSK: No gross abnormalities appreciated. No limitations to AROM/PROM appreciated.   Extremities: No cyanosis, edema, or clubbing evident. Neurovascularly intact.   Neuro: A&Ox3. CN 2-12 grossly intact. Able to respond to questions appropriately and clearly. No acute focal neurologic deficits appreciated.  Psych: Appropriate mood and behavior.        Assessment/Plan   1) Unstable angina in a patient with hx of CAD s/p Stents  Plan for left heart cath possible PCI  Risks,benefits and alternatives of Cardiac Cath possible PTCA including risk of bleeding,stroke,renal failure were explained to patient and he/she agrees to proceed   Peripheral IV 08/04/24 20 G Distal;Right;Upper Antecubital (Active)   Site Assessment Clean;Dry;Intact 08/04/24 2100   Dressing Status Clean;Dry 08/04/24 2100   Number of days: 1       Code Status:  Full Code    I spent 30 minutes in the professional and overall care of this patient.        Ambrose Worthy MD

## 2024-08-05 NOTE — PROGRESS NOTES
"   08/05/24 1104   Discharge Planning   Living Arrangements Alone   Support Systems Family members   Assistance Needed None   Type of Residence Private residence   Home or Post Acute Services None   Expected Discharge Disposition Home     Discharge assessment complete. Patient lives at home alone. Patient states that he overall independent without any ambulation concerns or needs at this time. Patient does not have a PCP at this time, asked patient if he would like a PCP referral. Patient declined at this time stating \"I'll figure it out eventually\". Patient states that he doesn't have insurance at this time. Advised that social work would be around to help him with possible resources. Patient states that he does not qualify for any assistance. Patient denies any discharge needs at this time. Patient is aware of Care Transitions if any needs should arise.   "

## 2024-08-05 NOTE — NURSING NOTE
Report received from BINTA RN.    With rounds, patient resting in bed without distress.  Site assessment to right radial cath site as charted.   Patient continues to complain of excess heat in room.  Thermostat is at lowest setting, three fans provided.

## 2024-08-05 NOTE — NURSING NOTE
Alerted by monitor tech that patient had a heart rate 30's.  Patient awakened by staff, EKG and vitals completed, placed in chart.   Patient argumentative that he had been awakened, did not want EKG or vitals taken, wanted to be left to sleep.  Patient is alert and oriented.  Denies chest pain at this time.    Attempts to educate patient regarding need for monitoring not receptive.    Patient has call light and personal items within reach.

## 2024-08-05 NOTE — CARE PLAN
The patient's goals for the shift include pain to go away    The clinical goals for the shift include patient will be pain free this shift    Over the shift, the patient did not make progress toward the following goals. Barriers to progression include . Recommendations to address these barriers include .    Problem: Pain  Goal: Takes deep breaths with improved pain control throughout the shift  Outcome: Not Progressing  Goal: Turns in bed with improved pain control throughout the shift  Outcome: Not Progressing  Goal: Walks with improved pain control throughout the shift  Outcome: Not Progressing  Goal: Performs ADL's with improved pain control throughout shift  Outcome: Not Progressing  Goal: Participates in PT with improved pain control throughout the shift  Outcome: Not Progressing  Goal: Free from opioid side effects throughout the shift  Outcome: Not Progressing  Goal: Free from acute confusion related to pain meds throughout the shift  Outcome: Not Progressing

## 2024-08-05 NOTE — H&P (VIEW-ONLY)
Inpatient consult to Cardiology  Consult performed by: Ambrose Worthy MD  Consult ordered by: Lotus Phillips PA-C        History Of Present Illness:    Gulshan Martinez is a 48 y.o. male with PMHx s/f CAD with h/o STEMI s/p POBA to D2 and LOLI to mid LAD 06/24/2021, HTN, hyperlipidemia and tobacco abuse presenting with chest pain. On 07/29/2024, the patient presented to the ED in [place] with chest pain with negative workup. He was told he was not having a MI or stroke. He continues to have intermittent chest pain throughout last week and met with his cardiologist Dr. Worthy on 08/02/24, who told him to come to ED if he's continuing to have worsening recurrent chest pain. Today, he notes that his chest pain started around 9am while at rest and has been ongoing, rating 7/10 pain, midsternal with radiation to left arm and back as well as pain across the chest along the diaphragmatic area. Per chart review, his previously ordered stress test and echocardiogram was not performed as the patient does not have insurance. His dad had MI with unknown age when that occurred, HTN and mom with diabetes. He smokes 0.5 ppd for the past 2 yrs, used to smoke 1 ppd for 30 yrs. Denies diaphoresis, headache, dizziness, n/v, palpitations, pleuritic pain, shortness of breath, abdominal pain, leg swelling, syncope, changes in urine and bowel symptoms.      Last Recorded Vitals:  Vitals:    08/04/24 1956 08/04/24 2350 08/05/24 0034 08/05/24 0430   BP: 144/69 111/70 120/78 105/67   BP Location: Left arm Left arm Left arm Left arm   Patient Position: Lying Lying Lying Lying   Pulse: 76 68 66 65   Resp: 16 18 19 20   Temp: 36.8 °C (98.2 °F) 36.4 °C (97.5 °F) 36.1 °C (97 °F) 35.9 °C (96.7 °F)   TempSrc: Temporal Temporal Temporal Temporal   SpO2: 98% 94% 95% 94%   Weight:    144 kg (316 lb 9.3 oz)   Height:           Last Labs:  CBC - 8/5/2024:  6:08 AM  6.4 16.2 263    46.8      CMP - 8/5/2024:  6:08 AM  8.8 6.4 19 --- 0.5   _ 3.7 30 68      PTT -  "No results in last year.  _   _ _     Troponin I, High Sensitivity   Date/Time Value Ref Range Status   08/04/2024 01:40 PM 5 0 - 20 ng/L Final   08/04/2024 12:25 PM 5 0 - 20 ng/L Final     Hemoglobin A1C   Date/Time Value Ref Range Status   08/05/2024 06:08 AM 6.3 (H) see below % Final     LDL Calculated   Date/Time Value Ref Range Status   06/26/2024 08:31 AM 57 <=99 mg/dL Final     Comment:                                 Near   Borderline      AGE      Desirable  Optimal    High     High     Very High     0-19 Y     0 - 109     ---    110-129   >/= 130     ----    20-24 Y     0 - 119     ---    120-159   >/= 160     ----      >24 Y     0 -  99   100-129  130-159   160-189     >/=190       VLDL   Date/Time Value Ref Range Status   06/26/2024 08:31 AM 18 0 - 40 mg/dL Final   03/28/2022 08:47 AM 21 0 - 40 mg/dL Final   06/25/2021 06:48 AM 27 0 - 40 mg/dL Final      Last I/O:  No intake/output data recorded.    Past Cardiology Tests (Last 3 Years):  EKG:  ECG 12 lead (Clinic Performed) 08/02/2024      ECG 12 Lead 06/26/2024      ECG 12 lead (Clinic Performed) 12/04/2023    Echo:  No results found for this or any previous visit from the past 1095 days.    Ejection Fractions:  No results found for: \"EF\"  Cath:  No results found for this or any previous visit from the past 1095 days.    Stress Test:  No results found for this or any previous visit from the past 1095 days.    Cardiac Imaging:  No results found for this or any previous visit from the past 1095 days.      Past Medical History:  He has a past medical history of Non-ST elevation (NSTEMI) myocardial infarction (Multi).    Past Surgical History:  He has a past surgical history that includes Other surgical history (06/25/2021).      Social History:  He reports that he has been smoking cigarettes. He has a 30 pack-year smoking history. He has never used smokeless tobacco. He reports current alcohol use of about 2.0 standard drinks of alcohol per week. He " reports that he does not use drugs.    Family History:  No family history on file.     Allergies:  Patient has no known allergies.    Inpatient Medications:  Scheduled medications   Medication Dose Route Frequency    aspirin  81 mg oral Daily    clopidogrel  75 mg oral Daily    enoxaparin  40 mg subcutaneous q12h TAISHA    lisinopril  40 mg oral Daily    metoprolol tartrate  25 mg oral BID    nitroglycerin  0.5 inch transdermal q6h during day    pantoprazole  40 mg oral Daily before breakfast    Or    pantoprazole  40 mg intravenous Daily before breakfast    perflutren protein A microsphere  0.5 mL intravenous Once in imaging    polyethylene glycol  17 g oral Daily    rosuvastatin  40 mg oral Daily     PRN medications   Medication    acetaminophen    bisacodyl    bisacodyl    guaiFENesin    melatonin    ondansetron     Continuous Medications   Medication Dose Last Rate     Outpatient Medications:  Current Outpatient Medications   Medication Instructions    aspirin 81 mg, oral, Daily    clopidogrel (PLAVIX) 75 mg, oral, Daily    lisinopril 40 mg, oral, Daily    metoprolol tartrate (LOPRESSOR) 25 mg, oral, 2 times daily    rosuvastatin (CRESTOR) 40 mg, oral, Daily       Physical Exam:  Constitutional: Pleasant and cooperative. Laying in bed in acute distress. Conversant.   Skin: Warm and dry; no obvious lesions, rashes, pallor, or jaundice. Good turgor.   Eyes: EOMI. Anicteric sclera.   ENT: Mucous membranes moist; no obvious injury or deformity appreciated.   Head and Neck: Normocephalic, atraumatic. ROM preserved. Trachea midline. No appreciable JVD.   Respiratory: Nonlabored on RA. Lungs clear to auscultation bilaterally without obvious adventitious sounds. Chest rise is equal.  Cardiovascular: RRR. No gross murmur, gallop, or rub. Extremities are warm and well-perfused with good capillary refill (< 3 seconds). Left chest wall tenderness.  GI: Abdomen soft, nondistended, tender epigastric and RUQ. No obvious  organomegaly appreciated. Bowel sounds are present and normoactive.  : No CVA tenderness.   MSK: No gross abnormalities appreciated. No limitations to AROM/PROM appreciated.   Extremities: No cyanosis, edema, or clubbing evident. Neurovascularly intact.   Neuro: A&Ox3. CN 2-12 grossly intact. Able to respond to questions appropriately and clearly. No acute focal neurologic deficits appreciated.  Psych: Appropriate mood and behavior.        Assessment/Plan   1) Unstable angina in a patient with hx of CAD s/p Stents  Plan for left heart cath possible PCI  Risks,benefits and alternatives of Cardiac Cath possible PTCA including risk of bleeding,stroke,renal failure were explained to patient and he/she agrees to proceed   Peripheral IV 08/04/24 20 G Distal;Right;Upper Antecubital (Active)   Site Assessment Clean;Dry;Intact 08/04/24 2100   Dressing Status Clean;Dry 08/04/24 2100   Number of days: 1       Code Status:  Full Code    I spent 30 minutes in the professional and overall care of this patient.        Ambrose Worthy MD

## 2024-08-05 NOTE — NURSING NOTE
"Report received from AM RN.   With rounds, patient complains of CP unchanged from previous.  States it is a 5-6/10.  Attempted to administer SL nitroglycerin and patient denies taking this states \"the only thing it does is give me a headache\".  Medicated as per mar and message sent to MD.  Orders received.     "

## 2024-08-06 ENCOUNTER — PHARMACY VISIT (OUTPATIENT)
Dept: PHARMACY | Facility: CLINIC | Age: 49
End: 2024-08-06
Payer: COMMERCIAL

## 2024-08-06 VITALS
SYSTOLIC BLOOD PRESSURE: 141 MMHG | OXYGEN SATURATION: 96 % | RESPIRATION RATE: 18 BRPM | HEIGHT: 71 IN | WEIGHT: 315 LBS | DIASTOLIC BLOOD PRESSURE: 94 MMHG | HEART RATE: 72 BPM | TEMPERATURE: 96.4 F | BODY MASS INDEX: 44.1 KG/M2

## 2024-08-06 PROCEDURE — 96374 THER/PROPH/DIAG INJ IV PUSH: CPT

## 2024-08-06 PROCEDURE — 99232 SBSQ HOSP IP/OBS MODERATE 35: CPT | Performed by: NURSE PRACTITIONER

## 2024-08-06 PROCEDURE — 2500000001 HC RX 250 WO HCPCS SELF ADMINISTERED DRUGS (ALT 637 FOR MEDICARE OP): Performed by: NURSE PRACTITIONER

## 2024-08-06 PROCEDURE — RXMED WILLOW AMBULATORY MEDICATION CHARGE

## 2024-08-06 PROCEDURE — 99239 HOSP IP/OBS DSCHRG MGMT >30: CPT | Performed by: PHYSICIAN ASSISTANT

## 2024-08-06 PROCEDURE — 2500000001 HC RX 250 WO HCPCS SELF ADMINISTERED DRUGS (ALT 637 FOR MEDICARE OP): Performed by: INTERNAL MEDICINE

## 2024-08-06 PROCEDURE — G0378 HOSPITAL OBSERVATION PER HR: HCPCS

## 2024-08-06 RX ORDER — ISOSORBIDE MONONITRATE 30 MG/1
30 TABLET, EXTENDED RELEASE ORAL DAILY
Status: DISCONTINUED | OUTPATIENT
Start: 2024-08-06 | End: 2024-08-06 | Stop reason: HOSPADM

## 2024-08-06 RX ORDER — PANTOPRAZOLE SODIUM 40 MG/1
40 TABLET, DELAYED RELEASE ORAL
Qty: 30 TABLET | Refills: 0 | Status: SHIPPED | OUTPATIENT
Start: 2024-08-07 | End: 2024-09-06

## 2024-08-06 RX ORDER — ISOSORBIDE MONONITRATE 30 MG/1
30 TABLET, EXTENDED RELEASE ORAL DAILY
Qty: 90 TABLET | Refills: 1 | Status: SHIPPED | OUTPATIENT
Start: 2024-08-06 | End: 2025-02-02

## 2024-08-06 RX ADMIN — ASPIRIN 81 MG: 81 TABLET, COATED ORAL at 08:15

## 2024-08-06 RX ADMIN — ISOSORBIDE MONONITRATE 30 MG: 30 TABLET, EXTENDED RELEASE ORAL at 09:29

## 2024-08-06 RX ADMIN — METOPROLOL TARTRATE 25 MG: 25 TABLET, FILM COATED ORAL at 09:00

## 2024-08-06 RX ADMIN — CLOPIDOGREL 75 MG: 75 TABLET ORAL at 08:15

## 2024-08-06 ASSESSMENT — PAIN - FUNCTIONAL ASSESSMENT: PAIN_FUNCTIONAL_ASSESSMENT: 0-10

## 2024-08-06 ASSESSMENT — PAIN SCALES - GENERAL: PAINLEVEL_OUTOF10: 0 - NO PAIN

## 2024-08-06 NOTE — PROGRESS NOTES
[facility] V[facility] Cardiology  Patient Name: Gulshan Martinez  Patient : 1975  Room/Bed:     HPI:  Gulshan Martinez is a 48 y.o. male with PMHx s/f CAD with h/o STEMI s/p POBA to D2 and LOLI to mid LAD 2021, HTN, hyperlipidemia and tobacco abuse presenting with chest pain. On 2024, the patient presented to the ED in [place] with chest pain with negative workup. He was told he was not having a MI or stroke. He continues to have intermittent chest pain throughout last week and met with his cardiologist Dr. Worthy on 24, who told him to come to ED if he's continuing to have worsening recurrent chest pain. Today, he notes that his chest pain started around 9am while at rest and has been ongoing, rating 7/10 pain, midsternal with radiation to left arm and back as well as pain across the chest along the diaphragmatic area. Per chart review, his previously ordered stress test and echocardiogram was not performed as the patient does not have insurance. His dad had MI with unknown age when that occurred, HTN and mom with diabetes. He smokes 0.5 ppd for the past 2 yrs, used to smoke 1 ppd for 30 yrs. Denies diaphoresis, headache, dizziness, n/v, palpitations, pleuritic pain, shortness of breath, abdominal pain, leg swelling, syncope, changes in urine and bowel symptoms.     Allergies:  Patient has no known allergies.    Subjective Data:  24:  Lying flat in bed and main complaint is of being tired.  Denies any chest pain/pressure, SOB or palpitations.  S/p LHC yesterday which showed mild nonobstructive CAD.  No complications with right radial cath site. SR on telemetry with HR 78 bpm.    Overnight Events:  None    Objective Data:  Vitals:    24 1822 24 2041 24 0300 24 0800   BP: 129/81 127/79 125/74 (!) 141/94   BP Location:  Left arm  Left arm   Patient Position:  Lying  Lying   Pulse: 82 68 68 72   Resp:  18 16 18   Temp:  36.2 °C (97.2 °F) 36.4 °C  "(97.6 °F) 35.8 °C (96.4 °F)   TempSrc:  Temporal Temporal Temporal   SpO2:  94% 96% 96%   Weight:       Height:           Last Labs:  Results from last 7 days   Lab Units 08/05/24  0608 08/04/24  1225   WBC AUTO x10*3/uL 6.4 8.6   HEMOGLOBIN g/dL 16.2 17.7*   HEMATOCRIT % 46.8 49.6   PLATELETS AUTO x10*3/uL 263 278       Results from last 7 days   Lab Units 08/05/24  0608 08/04/24  1225   SODIUM mmol/L 137 138   POTASSIUM mmol/L 4.0 4.6   CHLORIDE mmol/L 108* 106   CO2 mmol/L 23 25   BUN mg/dL 11 11   CREATININE mg/dL 0.94 0.97   CALCIUM mg/dL 8.8 9.2   PROTEIN TOTAL g/dL 6.4 7.0   BILIRUBIN TOTAL mg/dL 0.5 0.6   ALK PHOS U/L 68 78   ALT U/L 30 37   AST U/L 19 25   GLUCOSE mg/dL 105* 99       Results from last 7 days   Lab Units 08/04/24  1225   MAGNESIUM mg/dL 2.00       No results found for: \"LDLF\"     No results found for: \"BNP\"    Lab Results   Component Value Date    TROPHS 5 08/04/2024    TROPHS 5 08/04/2024        Lab Results   Component Value Date    TSH 4.97 (H) 08/05/2024           Lab Results   Component Value Date    HGBA1C 6.3 (H) 08/05/2024       Intake/Output    Intake/Output Summary (Last 24 hours) at 8/6/2024 0901  Last data filed at 8/5/2024 2200  Gross per 24 hour   Intake 600 ml   Output 5 ml   Net 595 ml      I/O last 2 completed shifts:  In: 600 (4.2 mL/kg) [P.O.:600]  Out: 5 (0 mL/kg) [Blood:5]  Weight: 143.6 kg     Past Medical History:  He has a past medical history of Non-ST elevation (NSTEMI) myocardial infarction (Multi).    Past Surgical History:  He has a past surgical history that includes Other surgical history (06/25/2021) and Cardiac catheterization (N/A, 8/5/2024).      Social History:  He reports that he has been smoking cigarettes. He has a 30 pack-year smoking history. He has never used smokeless tobacco. He reports current alcohol use of about 2.0 standard drinks of alcohol per week. He reports that he does not use drugs.    Family History:  No family history on " file.    Outpatient Medications  No current facility-administered medications on file prior to encounter.     Current Outpatient Medications on File Prior to Encounter   Medication Sig Dispense Refill    aspirin 81 mg EC tablet Take 1 tablet (81 mg) by mouth once daily. 90 tablet 3    clopidogrel (Plavix) 75 mg tablet Take 1 tablet (75 mg) by mouth once daily. 90 tablet 3    lisinopril 40 mg tablet Take 1 tablet (40 mg) by mouth once daily. 90 tablet 2    metoprolol tartrate (Lopressor) 25 mg tablet Take 1 tablet (25 mg) by mouth 2 times a day. 180 tablet 3    rosuvastatin (Crestor) 40 mg tablet Take 1 tablet (40 mg) by mouth once daily. 90 tablet 3    [DISCONTINUED] lisinopril 20 mg tablet Take 1 tablet (20 mg) by mouth once daily. 90 tablet 2       Scheduled medications  aspirin, 81 mg, oral, Daily  clopidogrel, 75 mg, oral, Daily  enoxaparin, 40 mg, subcutaneous, q12h TAISHA  lisinopril, 40 mg, oral, Daily  metoprolol tartrate, 25 mg, oral, BID  nitroglycerin, 0.5 inch, transdermal, q6h during day  pantoprazole, 40 mg, oral, Daily before breakfast   Or  pantoprazole, 40 mg, intravenous, Daily before breakfast  polyethylene glycol, 17 g, oral, Daily  rosuvastatin, 40 mg, oral, Daily      Continuous medications     PRN medications  PRN medications: acetaminophen, bisacodyl, bisacodyl, guaiFENesin, melatonin, ondansetron   Medications Prior to Admission   Medication Sig Dispense Refill Last Dose    aspirin 81 mg EC tablet Take 1 tablet (81 mg) by mouth once daily. 90 tablet 3     clopidogrel (Plavix) 75 mg tablet Take 1 tablet (75 mg) by mouth once daily. 90 tablet 3     lisinopril 40 mg tablet Take 1 tablet (40 mg) by mouth once daily. 90 tablet 2     metoprolol tartrate (Lopressor) 25 mg tablet Take 1 tablet (25 mg) by mouth 2 times a day. 180 tablet 3     rosuvastatin (Crestor) 40 mg tablet Take 1 tablet (40 mg) by mouth once daily. 90 tablet 3        Echo 8/5/24:   1. Left ventricular ejection fraction is normal,  by visual estimate at 60-65%.   2. There is moderate concentric left ventricular hypertrophy.   3. There is normal right ventricular global systolic function.   4. Aortic valve sclerosis.   5. There is moderate dilatation of the aortic root.  EF   Date/Time Value Ref Range Status   08/05/2024 08:49 AM 63 %         [facility] 8/5/24:  1. Mild non-obstructive coronary artery disease.     Physical Exam:  Vitals reviewed.   Constitutional:       Appearance: Healthy appearance.   Pulmonary:      Effort: Pulmonary effort is normal.      Breath sounds: Normal breath sounds.   Cardiovascular:      PMI at left midclavicular line. Normal rate. Regular rhythm. S1 with normal intensity. S2 with normal intensity.       Murmurs: There is no murmur.      Comments: Right radial cath site without swelling, hematoma, ecchymosis or bleeding.  Dressing dry and intact.  No bruit noted.   Edema:     Peripheral edema absent.   Abdominal:      General: Bowel sounds are normal.   Skin:     General: Skin is warm and dry.   Psychiatric:         Attention and Perception: Attention normal.         Mood and Affect: Mood normal.         Behavior: Behavior is cooperative.       Assessment/Plan:   1) Unstable angina in a patient with hx of CAD s/p Stents  Plan for left heart cath possible PCI  Risks,benefits and alternatives of Cardiac Cath possible PTCA including risk of bleeding,stroke,renal failure were explained to patient and he/she agrees to proceed     8/6/24: h/o PCI LAD in June 2021.  s/p C yesterday which showed mild nonobstructive CAD.  Echo with normal LVE F60-65%.  Moderate dilatation of the aortic root.  Will add isosorbide mononitrate 30 mg daily.  Hospitalist also started pantoprazole.      2. Aortic root dilatation  TTE Aug 2024 with aortic root 4.3 cm  Monitor with serial echocardiogram as outpatient    3. Dyslipidemia  Goal LDL <70.  Currently at goal.  Continue rosuvastatin 40 mg daily.     4. HTN   Stable  Continue current  antihypertensives: lisinopril 40 mg daily and metoprolol tartrate 25 mg BID     5. Prediabetes  Management per hospitalist/PCP.  Hemoglobin A1c 6.3%    Code Status  Full Code      Loren Snyder, APRN-CNP

## 2024-08-06 NOTE — CARE PLAN
The patient's goals for the shift include pain to go away    The clinical goals for the shift include patient will participate in care

## 2024-08-06 NOTE — NURSING NOTE
Patient asleep at this time with hourly rounding.  Previously had asked not to be disturbed.  Refusing vital signs if he is asleep.  Remains stable in NSR on tele.  Resp even and unlabored.  Snoring noted.

## 2024-08-06 NOTE — CARE PLAN
The patient's goals for the shift include pain to go away    The clinical goals for the shift include patient will have pain controlled with medications as ordered    Over the shift, the patient did not make progress toward the following goals. Barriers to progression include . Recommendations to address these barriers include .

## 2024-08-06 NOTE — DISCHARGE SUMMARY
Admission Date: 8/4/2024 12:19 PM  Discharge Date: 08/06/24   Condition at discharge: Stable    Discharge Diagnosis  Chest pain in a patient with hx of CAD s/p PCI to LAD  HTN  Tobacco use  Severe obesity BMI 44.15    Test Results Pending At Discharge  Pending Labs       No current pending labs.            Hospital Course   Gulshan Martinez is a 48 y.o. male with PMHx s/f CAD with h/o STEMI s/p POBA to D2 and LOLI to mid LAD 06/24/2021, HTN, hyperlipidemia and tobacco abuse presenting with chest pain. On 07/29/2024, the patient presented to the ED in [place] with chest pain with negative workup. He was told he was not having a MI or stroke. He continues to have intermittent chest pain throughout last week and met with his cardiologist Dr. Worthy on 08/02/24, who told him to come to ED if he's continuing to have worsening recurrent chest pain. Today, he notes that his chest pain started around 9am while at rest and has been ongoing, rating 7/10 pain, midsternal with radiation to left arm and back as well as pain across the chest along the diaphragmatic area. Per chart review, his previously ordered stress test and echocardiogram was not performed as the patient does not have insurance. His dad had MI with unknown age when that occurred, HTN and mom with diabetes. He smokes 0.5 ppd for the past 2 yrs, used to smoke 1 ppd for 30 yrs. HSTI negative. CXR - Marked tortuous aorta. Aneurysm or dissection can not be excluded. EKG; sinus rhythm at 70 bpm. A1c 6.7%. TSH elevated 4.97, Free T4 WNL. S/p LHC yesterday which showed mild nonobstructive CAD. No complications with right radial cath site. SR on telemetry with HR 78 bpm.  Patient denies any chest pain or pressure, shortness of breath or palpitations this morning.  He is agreeable to discharge to home.  Will start Imdur and Protonix.  He should have a close follow-up with cardiology.  Does not have PCP at this time, referral was made.    Consultations: Cardiology  consulted- treatment options were discussed and plan of care agreed upon.    Pertinent Physical Exam At Time of Discharge  Constitutional:       General: He is not in acute distress.     Appearance: Normal appearance. He is obese.      Comments: Laying nearly flat in bed on his right side, does awaken for questioning   HENT:      Head: Normocephalic and atraumatic.      Right Ear: External ear normal.      Left Ear: External ear normal.      Nose: Nose normal.      Mouth/Throat:      Mouth: Mucous membranes are moist.      Pharynx: Oropharynx is clear.   Eyes:      Extraocular Movements: Extraocular movements intact.      Conjunctiva/sclera: Conjunctivae normal.      Pupils: Pupils are equal, round, and reactive to light.   Cardiovascular:      Rate and Rhythm: Normal rate and regular rhythm.      Pulses: Normal pulses.      Heart sounds: Normal heart sounds.      Comments: Right radial cath site without swelling, hematoma, ecchymosis or bleeding.  Dressing dry and intact.  No bruit noted.   Pulmonary:      Effort: Pulmonary effort is normal. No respiratory distress.      Breath sounds: Normal breath sounds. No wheezing, rhonchi or rales.   Abdominal:      General: Bowel sounds are normal.      Palpations: Abdomen is soft.      Tenderness: There is no abdominal tenderness. There is no right CVA tenderness, left CVA tenderness, guarding or rebound.   Musculoskeletal:         General: No swelling. Normal range of motion.      Cervical back: Normal range of motion and neck supple.   Skin:     General: Skin is warm and dry.      Capillary Refill: Capillary refill takes less than 2 seconds.      Findings: No lesion or rash.   Neurological:      General: No focal deficit present.      Mental Status: He is alert and oriented to person, place, and time. Mental status is at baseline.   Psychiatric:         Mood and Affect: Mood normal.         Behavior: Behavior normal.     Code Status  Full Code     Home Medications      Medication List      START taking these medications     isosorbide mononitrate ER 30 mg 24 hr tablet; Commonly known as: Imdur;   Take 1 tablet (30 mg) by mouth once daily. Do not crush or chew.   pantoprazole 40 mg EC tablet; Commonly known as: ProtoNix; Take 1 tablet   (40 mg) by mouth once daily in the morning. Take before meals. Do not   crush, chew, or split.; Start taking on: August 7, 2024     CONTINUE taking these medications     aspirin 81 mg EC tablet; Take 1 tablet (81 mg) by mouth once daily.   clopidogrel 75 mg tablet; Commonly known as: Plavix; Take 1 tablet (75   mg) by mouth once daily.   lisinopril 40 mg tablet; Take 1 tablet (40 mg) by mouth once daily.   metoprolol tartrate 25 mg tablet; Commonly known as: Lopressor; Take 1   tablet (25 mg) by mouth 2 times a day.   rosuvastatin 40 mg tablet; Commonly known as: Crestor; Take 1 tablet (40   mg) by mouth once daily.       Outpatient Follow-Up  Future Appointments   Date Time Provider Department Center   11/4/2024 10:00 AM ANI Toussaint-CNP [id number]         At the time of discharge, patient's pain was controlled with oral analgesia, patient was urinating, having BMs, sleeping, and eating well. Follow up recommendations are in discharge paperwork. Discharge plan was discussed with the patient/family and all of the questions were answered. Medications were ordered to be delivered to bedside prior to discharge.     Discharge planning took greater than 35 minutes    Diagnoses at time of discharge:  Chest pain in a patient with hx of CAD s/p PCI to LAD  HTN  Tobacco use  Severe obesity BMI 44.15    Anticipated discharge destination: home    Please see orders for more complete plan    Vesta Angelo PA-C

## 2024-08-06 NOTE — NURSING NOTE
Patient states he does not wish to be bothered or awakened if  he is sleeping.  Denies vital signs  if sleeping.  Denies bedside care if  sleeping.    Patient continues to refuse medications as per MAR.

## 2024-08-09 LAB
ATRIAL RATE: 71 BPM
ATRIAL RATE: 75 BPM
P AXIS: 24 DEGREES
P AXIS: 28 DEGREES
PR INTERVAL: 195 MS
PR INTERVAL: 195 MS
Q ONSET: 252 MS
Q ONSET: 252 MS
QRS COUNT: 11 BEATS
QRS COUNT: 12 BEATS
QRS DURATION: 96 MS
QRS DURATION: 97 MS
QT INTERVAL: 385 MS
QT INTERVAL: 392 MS
QTC CALCULATION(BAZETT): 423 MS
QTC CALCULATION(BAZETT): 425 MS
QTC FREDERICIA: 411 MS
QTC FREDERICIA: 412 MS
R AXIS: -51 DEGREES
R AXIS: -57 DEGREES
T AXIS: 48 DEGREES
T AXIS: 57 DEGREES
T OFFSET: 444 MS
T OFFSET: 448 MS
VENTRICULAR RATE: 70 BPM
VENTRICULAR RATE: 73 BPM

## 2024-09-12 ENCOUNTER — TELEPHONE (OUTPATIENT)
Dept: CARDIOLOGY | Facility: HOSPITAL | Age: 49
End: 2024-09-12

## 2024-09-12 DIAGNOSIS — I25.10 CORONARY ARTERY DISEASE INVOLVING NATIVE CORONARY ARTERY OF NATIVE HEART WITHOUT ANGINA PECTORIS: ICD-10-CM

## 2024-09-12 DIAGNOSIS — I25.10 ATHEROSCLEROSIS OF NATIVE CORONARY ARTERY OF NATIVE HEART WITHOUT ANGINA PECTORIS: ICD-10-CM

## 2024-09-12 DIAGNOSIS — Z98.61 S/P PTCA (PERCUTANEOUS TRANSLUMINAL CORONARY ANGIOPLASTY): ICD-10-CM

## 2024-09-12 DIAGNOSIS — R07.9 CHEST PAIN, UNSPECIFIED TYPE: ICD-10-CM

## 2024-09-12 RX ORDER — METOPROLOL TARTRATE 25 MG/1
25 TABLET, FILM COATED ORAL 2 TIMES DAILY
Qty: 180 TABLET | Refills: 3 | Status: SHIPPED | OUTPATIENT
Start: 2024-09-12 | End: 2025-09-12

## 2024-09-12 RX ORDER — ROSUVASTATIN CALCIUM 40 MG/1
40 TABLET, COATED ORAL DAILY
Qty: 90 TABLET | Refills: 3 | Status: SHIPPED | OUTPATIENT
Start: 2024-09-12 | End: 2025-09-12

## 2024-09-12 RX ORDER — CLOPIDOGREL BISULFATE 75 MG/1
75 TABLET ORAL DAILY
Qty: 90 TABLET | Refills: 3 | Status: SHIPPED | OUTPATIENT
Start: 2024-09-12 | End: 2025-09-07

## 2024-09-12 RX ORDER — ISOSORBIDE MONONITRATE 30 MG/1
30 TABLET, EXTENDED RELEASE ORAL DAILY
Qty: 90 TABLET | Refills: 3 | Status: SHIPPED | OUTPATIENT
Start: 2024-09-12 | End: 2025-09-07

## 2024-09-12 NOTE — TELEPHONE ENCOUNTER
1:1 observation maintained. Environment remain secured for safety. Patient is calm and cooperative at this time. A&Ox4, VSS, and NAD noted. All belongings remain secured at this time. Will continue to monitor.    Patient called in about medication that need to go to a new pharmacy. The new pharmacy [facility]. He went to get them all renewed them. If we can please send them all over that would be great!

## 2024-11-04 ENCOUNTER — APPOINTMENT (OUTPATIENT)
Dept: CARDIOLOGY | Facility: HOSPITAL | Age: 49
End: 2024-11-04

## 2024-11-08 ENCOUNTER — APPOINTMENT (OUTPATIENT)
Dept: CARDIOLOGY | Facility: HOSPITAL | Age: 49
End: 2024-11-08

## 2024-11-11 ENCOUNTER — TELEPHONE (OUTPATIENT)
Dept: CARDIOLOGY | Facility: HOSPITAL | Age: 49
End: 2024-11-11

## 2024-11-12 PROBLEM — I77.810 AORTIC ROOT DILATATION (CMS-HCC): Status: ACTIVE | Noted: 2024-11-12

## 2024-11-12 RX ORDER — ASPIRIN 81 MG/1
81 TABLET ORAL DAILY
Qty: 90 TABLET | Refills: 3 | Status: CANCELLED | OUTPATIENT
Start: 2024-11-12 | End: 2025-11-12

## 2024-11-12 ASSESSMENT — ENCOUNTER SYMPTOMS
CHILLS: 0
HEMATOCHEZIA: 0
HEMATURIA: 0
IRREGULAR HEARTBEAT: 0
NAUSEA: 0
ORTHOPNEA: 0
NEAR-SYNCOPE: 0
WHEEZING: 0
COUGH: 0
ALTERED MENTAL STATUS: 0
VOMITING: 0
SYNCOPE: 0
PALPITATIONS: 0
SHORTNESS OF BREATH: 0
BACK PAIN: 1
FEVER: 0

## 2024-11-12 NOTE — PROGRESS NOTES
Chief Complaint/Reason for Visit:  Follow-up 3 month cardiovascular follow up    History Of Present Illness:    Gulshan Martinez is a 49 y.o. male that presents to the office for 3 month follow up.    Taking medications as prescribed.     PMH is significant for CAD with h/o STEMI s/p POBA to D2 and LOLI to mid LAD 06/24/2021, HTN, hyperlipidemia and nicotine dependence. Current smoker.     Chronic ACOSTA that is at baseline. 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 Main concern today is that he thinks atorvastatin is causing increased urination and possibly back pain (comes and goes). He did have a back injury in 2008 when he fell 10-15 feet from a ladder. He admits to missing his atorvastatin dose at times because he forget at HS. When he didn't take the atorvastatin he had less frequent urination. His atorvastatin has since been switched to rosuvastatin.     Past Medical History:  He has a past medical history of Non-ST elevation (NSTEMI) myocardial infarction (Multi).    Past Surgical History:  He has a past surgical history that includes Other surgical history (06/25/2021) and Cardiac catheterization (N/A, 8/5/2024).      Social History:  He reports that he has been smoking cigarettes. He has a 30 pack-year smoking history. He has never used smokeless tobacco. He reports current alcohol use of about 2.0 standard drinks of alcohol per week. He reports that he does not use drugs.    Family History:  No family history on file.     Allergies:  Bee venom protein (honey bee)    Review of Systems   Constitutional: Negative for chills and fever.   Cardiovascular:  Positive for chest pain (chest pressure off and on that has been chronic - improves after going to the chiropractor) and dyspnea on exertion (chronic). Negative for irregular heartbeat, leg swelling, near-syncope, orthopnea, palpitations and syncope.   Respiratory:  Negative for cough, shortness of breath and wheezing.     Musculoskeletal:  Positive for back pain (chronic neck and back pain).   Gastrointestinal:  Negative for hematochezia, melena, nausea and vomiting.   Genitourinary:  Negative for hematuria.   Psychiatric/Behavioral:  Negative for altered mental status.        Objective      Vitals reviewed.   Constitutional:       Appearance: Healthy appearance.   Pulmonary:      Effort: Pulmonary effort is normal.      Breath sounds: Normal breath sounds.   Cardiovascular:      PMI at left midclavicular line. Normal rate. Regular rhythm. S1 with normal intensity. S2 with normal intensity.       Murmurs: There is no murmur.   Edema:     Peripheral edema absent.   Abdominal:      General: Bowel sounds are normal.   Skin:     General: Skin is warm and dry.   Psychiatric:         Attention and Perception: Attention normal.         Mood and Affect: Mood normal.         Behavior: Behavior is cooperative.         Current Outpatient Medications   Medication Instructions    aspirin 81 mg, oral, Daily    clopidogrel (PLAVIX) 75 mg, oral, Daily    isosorbide mononitrate ER (IMDUR) 30 mg, oral, Daily, Do not crush or chew.    lisinopril 40 mg, oral, Daily    metoprolol tartrate (LOPRESSOR) 25 mg, oral, 2 times daily    pantoprazole (PROTONIX) 40 mg, oral, Daily before breakfast, Do not crush, chew, or split.    rosuvastatin (CRESTOR) 40 mg, oral, Daily        Reviewed the following Labs:  CBC -  Lab Results   Component Value Date    WBC 6.4 08/05/2024    HGB 16.2 08/05/2024    HCT 46.8 08/05/2024    MCV 87 08/05/2024     08/05/2024       RENAL FUNCTION PANEL -   Lab Results   Component Value Date    GLUCOSE 105 (H) 08/05/2024     08/05/2024    K 4.0 08/05/2024     (H) 08/05/2024    CO2 23 08/05/2024    ANIONGAP 10 08/05/2024    BUN 11 08/05/2024    CREATININE 0.94 08/05/2024    GFRMALE >90 03/28/2022    CALCIUM 8.8 08/05/2024    ALBUMIN 3.7 08/05/2024        CMP -  Lab Results   Component Value Date    CALCIUM 8.8  "08/05/2024    PROT 6.4 08/05/2024    ALBUMIN 3.7 08/05/2024    AST 19 08/05/2024    ALT 30 08/05/2024    ALKPHOS 68 08/05/2024    BILITOT 0.5 08/05/2024       LIPID PANEL -   Lab Results   Component Value Date    CHOL 114 06/26/2024    TRIG 92 06/26/2024    HDL 38.5 06/26/2024    CHHDL 3.0 06/26/2024    LDLF 64 03/28/2022    VLDL 18 06/26/2024    NHDL 76 06/26/2024     Lab Results   Component Value Date    LDLCALC 57 06/26/2024       Lab Results   Component Value Date    HGBA1C 6.3 (H) 08/05/2024       Lab Results   Component Value Date    TSH 4.97 (H) 08/05/2024       No results found for this or any previous visit.     Reviewed the following Cardiology Tests:    [facility] 8/5/24  1. Mild non-obstructive coronary artery disease.     TTE 8/5/24  1. Left ventricular ejection fraction is normal, by visual estimate at 60-65%.   2. There is moderate concentric left ventricular hypertrophy.   3. There is normal right ventricular global systolic function.   4. Aortic valve sclerosis.   5. There is moderate dilatation of the aortic root.    TTE 3/28/2022 showed LV systolic function is low normal with an EF of 50 to 55%.     C 6/24/2021 showed 100% stenosis of the ostial second diagonal and 70% stenosis of the mid LAD. Status post successful POBA of D2 and 3.5 x 22 mm LOLI to the mid LAD.    Visit Vitals  /88   Pulse 82   Ht 1.803 m (5' 11\")   Wt 143 kg (316 lb)   SpO2 97%   BMI 44.07 kg/m²   Smoking Status Every Day   BSA 2.68 m²       Assessment/Plan   The primary encounter diagnosis was Atherosclerosis of native coronary artery of native heart without angina pectoris. Diagnoses of Hyperlipidemia, unspecified hyperlipidemia type, Benign essential hypertension, Aortic root dilatation (CMS-HCC), and Coronary artery disease involving native coronary artery of native heart without angina pectoris were also pertinent to this visit.    1. CAD s/p PCI LAD in June 2021  Continue DAPT - aspirin 81 mg daily and clopidogrel 75 mg " daily  Continue rosuvastatin 40 mg daily and Metoprolol tartrate 25 mg BID  Quit taking isosorbide mononitrate 30 mg daily on his own - reason unclear, but he reports that he continues to c/o intermittent chest pain that improves after he goes to the chiropractor and having his back adjusted  TTE March 2022 with LVEF 50-55%   Recommend complete smoking cessation  [facility] Aug 2024 with mild nonobstructive CAD  TTE Aug 2024 with LVEF 60-65%    2. Dyslipidemia  Goal LDL <70.  Currently at goal.  Continue rosuvastatin 40 mg daily.   Atorvastatin stopped previously as patient stated it was causing back pain and increased urination     3. HTN   Stable  Continue current antihypertensives: lisinopril 40 mg daily and metoprolol tartrate 25 mg BID     4. Aortic root dilatation  TTE Aug 2024 with aortic root 4.3 cm  Monitor with serial echocardiogram    Loren Snyder, APRN-CNP

## 2024-11-12 NOTE — PATIENT INSTRUCTIONS
Recommend Mediterranean style of eating  Continue current medications  Work on quitting smoking  Follow-up with Dr. Worthy in 6 months  If you have any questions or cardiac concerns, please call our office at [phone].

## 2024-11-15 ENCOUNTER — OFFICE VISIT (OUTPATIENT)
Dept: CARDIOLOGY | Facility: HOSPITAL | Age: 49
End: 2024-11-15

## 2024-11-15 VITALS
HEART RATE: 82 BPM | HEIGHT: 71 IN | SYSTOLIC BLOOD PRESSURE: 130 MMHG | WEIGHT: 315 LBS | DIASTOLIC BLOOD PRESSURE: 88 MMHG | OXYGEN SATURATION: 97 % | BODY MASS INDEX: 44.1 KG/M2

## 2024-11-15 DIAGNOSIS — E78.5 HYPERLIPIDEMIA, UNSPECIFIED HYPERLIPIDEMIA TYPE: Chronic | ICD-10-CM

## 2024-11-15 DIAGNOSIS — I25.10 ATHEROSCLEROSIS OF NATIVE CORONARY ARTERY OF NATIVE HEART WITHOUT ANGINA PECTORIS: Primary | ICD-10-CM

## 2024-11-15 DIAGNOSIS — I77.810 AORTIC ROOT DILATATION (CMS-HCC): ICD-10-CM

## 2024-11-15 DIAGNOSIS — I10 BENIGN ESSENTIAL HYPERTENSION: Chronic | ICD-10-CM

## 2024-11-15 DIAGNOSIS — I25.10 CORONARY ARTERY DISEASE INVOLVING NATIVE CORONARY ARTERY OF NATIVE HEART WITHOUT ANGINA PECTORIS: ICD-10-CM

## 2024-11-15 PROCEDURE — 99213 OFFICE O/P EST LOW 20 MIN: CPT | Performed by: NURSE PRACTITIONER

## 2024-11-15 PROCEDURE — 3079F DIAST BP 80-89 MM HG: CPT | Performed by: NURSE PRACTITIONER

## 2024-11-15 PROCEDURE — 3008F BODY MASS INDEX DOCD: CPT | Performed by: NURSE PRACTITIONER

## 2024-11-15 PROCEDURE — 3075F SYST BP GE 130 - 139MM HG: CPT | Performed by: NURSE PRACTITIONER

## 2024-11-15 PROCEDURE — 4004F PT TOBACCO SCREEN RCVD TLK: CPT | Performed by: NURSE PRACTITIONER

## 2024-11-15 ASSESSMENT — ENCOUNTER SYMPTOMS: DYSPNEA ON EXERTION: 1

## 2025-05-16 ENCOUNTER — APPOINTMENT (OUTPATIENT)
Dept: CARDIOLOGY | Facility: HOSPITAL | Age: 50
End: 2025-05-16

## 2025-05-20 ENCOUNTER — APPOINTMENT (OUTPATIENT)
Dept: CARDIOLOGY | Facility: HOSPITAL | Age: 50
End: 2025-05-20

## 2025-05-24 ENCOUNTER — APPOINTMENT (OUTPATIENT)
Dept: RADIOLOGY | Facility: HOSPITAL | Age: 50
End: 2025-05-24

## 2025-05-24 ENCOUNTER — HOSPITAL ENCOUNTER (EMERGENCY)
Facility: HOSPITAL | Age: 50
Discharge: HOME | End: 2025-05-24

## 2025-05-24 ENCOUNTER — APPOINTMENT (OUTPATIENT)
Dept: CARDIOLOGY | Facility: HOSPITAL | Age: 50
End: 2025-05-24

## 2025-05-24 VITALS
BODY MASS INDEX: 42 KG/M2 | SYSTOLIC BLOOD PRESSURE: 150 MMHG | HEIGHT: 71 IN | RESPIRATION RATE: 12 BRPM | DIASTOLIC BLOOD PRESSURE: 100 MMHG | HEART RATE: 74 BPM | OXYGEN SATURATION: 100 % | TEMPERATURE: 98.5 F | WEIGHT: 300 LBS

## 2025-05-24 DIAGNOSIS — I25.10 CORONARY ARTERY DISEASE INVOLVING NATIVE CORONARY ARTERY OF NATIVE HEART WITHOUT ANGINA PECTORIS: ICD-10-CM

## 2025-05-24 DIAGNOSIS — R07.9 CHEST PAIN, UNSPECIFIED TYPE: Primary | ICD-10-CM

## 2025-05-24 DIAGNOSIS — I15.9 SECONDARY HYPERTENSION: ICD-10-CM

## 2025-05-24 LAB
ALBUMIN SERPL BCP-MCNC: 3.9 G/DL (ref 3.4–5)
ALP SERPL-CCNC: 70 U/L (ref 33–120)
ALT SERPL W P-5'-P-CCNC: 38 U/L (ref 10–52)
ANION GAP SERPL CALC-SCNC: 12 MMOL/L (ref 10–20)
AST SERPL W P-5'-P-CCNC: 17 U/L (ref 9–39)
BASOPHILS # BLD AUTO: 0.08 X10*3/UL (ref 0–0.1)
BASOPHILS NFR BLD AUTO: 0.9 %
BILIRUB SERPL-MCNC: 0.4 MG/DL (ref 0–1.2)
BUN SERPL-MCNC: 14 MG/DL (ref 6–23)
CALCIUM SERPL-MCNC: 9.3 MG/DL (ref 8.6–10.3)
CARDIAC TROPONIN I PNL SERPL HS: 7 NG/L (ref 0–20)
CARDIAC TROPONIN I PNL SERPL HS: 7 NG/L (ref 0–20)
CHLORIDE SERPL-SCNC: 106 MMOL/L (ref 98–107)
CO2 SERPL-SCNC: 24 MMOL/L (ref 21–32)
CREAT SERPL-MCNC: 0.89 MG/DL (ref 0.5–1.3)
EGFRCR SERPLBLD CKD-EPI 2021: >90 ML/MIN/1.73M*2
EOSINOPHIL # BLD AUTO: 0.35 X10*3/UL (ref 0–0.7)
EOSINOPHIL NFR BLD AUTO: 4 %
ERYTHROCYTE [DISTWIDTH] IN BLOOD BY AUTOMATED COUNT: 12.8 % (ref 11.5–14.5)
GLUCOSE SERPL-MCNC: 82 MG/DL (ref 74–99)
HCT VFR BLD AUTO: 49.3 % (ref 41–52)
HGB BLD-MCNC: 17.1 G/DL (ref 13.5–17.5)
IMM GRANULOCYTES # BLD AUTO: 0.02 X10*3/UL (ref 0–0.7)
IMM GRANULOCYTES NFR BLD AUTO: 0.2 % (ref 0–0.9)
LYMPHOCYTES # BLD AUTO: 2.39 X10*3/UL (ref 1.2–4.8)
LYMPHOCYTES NFR BLD AUTO: 27 %
MCH RBC QN AUTO: 29.5 PG (ref 26–34)
MCHC RBC AUTO-ENTMCNC: 34.7 G/DL (ref 32–36)
MCV RBC AUTO: 85 FL (ref 80–100)
MONOCYTES # BLD AUTO: 0.94 X10*3/UL (ref 0.1–1)
MONOCYTES NFR BLD AUTO: 10.6 %
NEUTROPHILS # BLD AUTO: 5.08 X10*3/UL (ref 1.2–7.7)
NEUTROPHILS NFR BLD AUTO: 57.3 %
NRBC BLD-RTO: 0 /100 WBCS (ref 0–0)
PLATELET # BLD AUTO: 276 X10*3/UL (ref 150–450)
POTASSIUM SERPL-SCNC: 3.9 MMOL/L (ref 3.5–5.3)
PROT SERPL-MCNC: 6.7 G/DL (ref 6.4–8.2)
RBC # BLD AUTO: 5.79 X10*6/UL (ref 4.5–5.9)
SODIUM SERPL-SCNC: 138 MMOL/L (ref 136–145)
WBC # BLD AUTO: 8.9 X10*3/UL (ref 4.4–11.3)

## 2025-05-24 PROCEDURE — 36415 COLL VENOUS BLD VENIPUNCTURE: CPT | Performed by: NURSE PRACTITIONER

## 2025-05-24 PROCEDURE — 71046 X-RAY EXAM CHEST 2 VIEWS: CPT

## 2025-05-24 PROCEDURE — 85025 COMPLETE CBC W/AUTO DIFF WBC: CPT | Performed by: NURSE PRACTITIONER

## 2025-05-24 PROCEDURE — 99285 EMERGENCY DEPT VISIT HI MDM: CPT | Mod: 25

## 2025-05-24 PROCEDURE — 80053 COMPREHEN METABOLIC PANEL: CPT | Performed by: NURSE PRACTITIONER

## 2025-05-24 PROCEDURE — 2500000001 HC RX 250 WO HCPCS SELF ADMINISTERED DRUGS (ALT 637 FOR MEDICARE OP): Performed by: NURSE PRACTITIONER

## 2025-05-24 PROCEDURE — 84484 ASSAY OF TROPONIN QUANT: CPT | Performed by: NURSE PRACTITIONER

## 2025-05-24 PROCEDURE — 93005 ELECTROCARDIOGRAM TRACING: CPT

## 2025-05-24 PROCEDURE — 71046 X-RAY EXAM CHEST 2 VIEWS: CPT | Mod: FOREIGN READ | Performed by: RADIOLOGY

## 2025-05-24 RX ORDER — LISINOPRIL 40 MG/1
40 TABLET ORAL DAILY
Qty: 90 TABLET | Refills: 0 | Status: SHIPPED | OUTPATIENT
Start: 2025-05-24 | End: 2026-02-18

## 2025-05-24 RX ORDER — ACETAMINOPHEN 325 MG/1
975 TABLET ORAL ONCE
Status: COMPLETED | OUTPATIENT
Start: 2025-05-24 | End: 2025-05-24

## 2025-05-24 RX ORDER — METOPROLOL TARTRATE 25 MG/1
25 TABLET, FILM COATED ORAL 2 TIMES DAILY
Qty: 180 TABLET | Refills: 0 | Status: SHIPPED | OUTPATIENT
Start: 2025-05-24 | End: 2026-05-24

## 2025-05-24 RX ORDER — NITROGLYCERIN 0.4 MG/1
0.4 TABLET SUBLINGUAL EVERY 5 MIN PRN
Status: DISCONTINUED | OUTPATIENT
Start: 2025-05-24 | End: 2025-05-24 | Stop reason: HOSPADM

## 2025-05-24 RX ADMIN — NITROGLYCERIN 0.4 MG: 0.4 TABLET SUBLINGUAL at 14:55

## 2025-05-24 RX ADMIN — ACETAMINOPHEN 975 MG: 325 TABLET ORAL at 14:55

## 2025-05-24 ASSESSMENT — PAIN - FUNCTIONAL ASSESSMENT: PAIN_FUNCTIONAL_ASSESSMENT: 0-10

## 2025-05-24 ASSESSMENT — LIFESTYLE VARIABLES
HAVE YOU EVER FELT YOU SHOULD CUT DOWN ON YOUR DRINKING: NO
TOTAL SCORE: 0
EVER FELT BAD OR GUILTY ABOUT YOUR DRINKING: NO
HAVE PEOPLE ANNOYED YOU BY CRITICIZING YOUR DRINKING: NO
EVER HAD A DRINK FIRST THING IN THE MORNING TO STEADY YOUR NERVES TO GET RID OF A HANGOVER: NO

## 2025-05-24 ASSESSMENT — HEART SCORE
HEART SCORE: 3
HISTORY: SLIGHTLY SUSPICIOUS
AGE: 45-64
TROPONIN: LESS THAN OR EQUAL TO NORMAL LIMIT
RISK FACTORS: >2 RISK FACTORS OR HX OF ATHEROSCLEROTIC DISEASE
ECG: NORMAL

## 2025-05-24 ASSESSMENT — COLUMBIA-SUICIDE SEVERITY RATING SCALE - C-SSRS
1. IN THE PAST MONTH, HAVE YOU WISHED YOU WERE DEAD OR WISHED YOU COULD GO TO SLEEP AND NOT WAKE UP?: NO
2. HAVE YOU ACTUALLY HAD ANY THOUGHTS OF KILLING YOURSELF?: NO
6. HAVE YOU EVER DONE ANYTHING, STARTED TO DO ANYTHING, OR PREPARED TO DO ANYTHING TO END YOUR LIFE?: NO

## 2025-05-24 ASSESSMENT — PAIN SCALES - GENERAL: PAINLEVEL_OUTOF10: 6

## 2025-05-24 ASSESSMENT — PAIN DESCRIPTION - LOCATION: LOCATION: HEAD

## 2025-05-24 ASSESSMENT — PAIN DESCRIPTION - PAIN TYPE: TYPE: ACUTE PAIN

## 2025-05-24 NOTE — ED NOTES
"The t. Is upset that [place]. The pt. Has Hx HTN, noncompliant with medications. He states the he has been taking supplements and has been \"feeling great.\" The pt. Currently has complaints associated with uncontrolled HTN.     Chandler Mccormick RN  05/24/25 2440    "

## 2025-05-24 NOTE — ED PROVIDER NOTES
Chief Complaint   Patient presents with   • Hypertension       HPI       49 year old male presents to the Emergency Department today complaining of having an elevated blood pressure today noted at the [facility]. Has had a diffuse headache since this am. The headache is not the first or worst of their life. Not of sudden onset or thunderclap in nature. Reports that over the past several hours, he has developed midsternal chest pressure that has been constant, radiates to his jaw, and varies in intensity. Denies any associated fever, chills, neck pain, shortness of breath, abdominal pain, nausea, vomiting, diarrhea, constipation, or urinary symptoms.       History provided by:  Patient             Patient History   Medical History[1]  Surgical History[2]  Family History[3]  Social History[4]        Physical Exam  Constitutional:       Appearance: Normal appearance.   HENT:      Head: Normocephalic.      Right Ear: Tympanic membrane, ear canal and external ear normal.      Left Ear: Tympanic membrane, ear canal and external ear normal.      Nose: Nose normal.      Mouth/Throat:      Mouth: Mucous membranes are moist.      Pharynx: Oropharynx is clear. No oropharyngeal exudate or posterior oropharyngeal erythema.   Eyes:      Conjunctiva/sclera: Conjunctivae normal.      Pupils: Pupils are equal, round, and reactive to light.   Cardiovascular:      Rate and Rhythm: Normal rate and regular rhythm.      Pulses:           Radial pulses are 3+ on the right side and 3+ on the left side.        Dorsalis pedis pulses are 3+ on the right side and 3+ on the left side.      Heart sounds: Normal heart sounds. No murmur heard.     No friction rub. No gallop.   Pulmonary:      Effort: Pulmonary effort is normal. No respiratory distress.      Breath sounds: Normal breath sounds. No wheezing, rhonchi or rales.   Abdominal:      General: Abdomen is flat. Bowel sounds are normal.      Palpations: Abdomen is soft.      Tenderness: There is no  abdominal tenderness. There is no right CVA tenderness, left CVA tenderness, guarding or rebound. Negative signs include Fuentes's sign and McBurney's sign.   Musculoskeletal:         General: No swelling or deformity.      Cervical back: Full passive range of motion without pain.      Right lower leg: No edema.      Left lower leg: No edema.   Lymphadenopathy:      Cervical: No cervical adenopathy.   Skin:     Capillary Refill: Capillary refill takes less than 2 seconds.      Coloration: Skin is not jaundiced.      Findings: No rash.   Neurological:      General: No focal deficit present.      Mental Status: He is alert and oriented to person, place, and time. Mental status is at baseline.      Gait: Gait is intact.   Psychiatric:         Mood and Affect: Mood normal.         Behavior: Behavior is cooperative.         Labs Reviewed   COMPREHENSIVE METABOLIC PANEL - Normal       Result Value    Glucose 82      Sodium 138      Potassium 3.9      Chloride 106      Bicarbonate 24      Anion Gap 12      Urea Nitrogen 14      Creatinine 0.89      eGFR >90      Calcium 9.3      Albumin 3.9      Alkaline Phosphatase 70      Total Protein 6.7      AST 17      Bilirubin, Total 0.4      ALT 38     SERIAL TROPONIN-INITIAL - Normal    Troponin I, High Sensitivity 7      Narrative:     Less than 99th percentile of normal range cutoff-  Female and children under 18 years old <14 ng/L; Male <21 ng/L: Negative  Repeat testing should be performed if clinically indicated.     Female and children under 18 years old 14-50 ng/L; Male 21-50 ng/L:  Consistent with possible cardiac damage and possible increased clinical   risk. Serial measurements may help to assess extent of myocardial damage.     >50 ng/L: Consistent with cardiac damage, increased clinical risk and  myocardial infarction. Serial measurements may help assess extent of   myocardial damage.      NOTE: Children less than 1 year old may have higher baseline troponin   levels  and results should be interpreted in conjunction with the overall   clinical context.     NOTE: Troponin I testing is performed using a different   testing methodology at [facility] than at other   system hospitals. Direct result comparisons should only   be made within the same method.   SERIAL TROPONIN, 1 HOUR - Normal    Troponin I, High Sensitivity 7      Narrative:     Less than 99th percentile of normal range cutoff-  Female and children under 18 years old <14 ng/L; Male <21 ng/L: Negative  Repeat testing should be performed if clinically indicated.     Female and children under 18 years old 14-50 ng/L; Male 21-50 ng/L:  Consistent with possible cardiac damage and possible increased clinical   risk. Serial measurements may help to assess extent of myocardial damage.     >50 ng/L: Consistent with cardiac damage, increased clinical risk and  myocardial infarction. Serial measurements may help assess extent of   myocardial damage.      NOTE: Children less than 1 year old may have higher baseline troponin   levels and results should be interpreted in conjunction with the overall   clinical context.     NOTE: Troponin I testing is performed using a different   testing methodology at [facility] than at other   [facility]. Direct result comparisons should only   be made within the same method.   CBC WITH AUTO DIFFERENTIAL    WBC 8.9      nRBC 0.0      RBC 5.79      Hemoglobin 17.1      Hematocrit 49.3      MCV 85      MCH 29.5      MCHC 34.7      RDW 12.8      Platelets 276      Neutrophils % 57.3      Immature Granulocytes %, Automated 0.2      Lymphocytes % 27.0      Monocytes % 10.6      Eosinophils % 4.0      Basophils % 0.9      Neutrophils Absolute 5.08      Immature Granulocytes Absolute, Automated 0.02      Lymphocytes Absolute 2.39      Monocytes Absolute 0.94      Eosinophils Absolute 0.35      Basophils Absolute 0.08     TROPONIN SERIES- (INITIAL, 1 HR)    Narrative:      The following orders were created for panel order Troponin I Series, High Sensitivity (0, 1 HR).  Procedure                               Abnormality         Status                     ---------                               -----------         ------                     Troponin I, High Sensiti...[[id number]]  Normal              Final result               Troponin, High Sensitivi...[[id number]]  Normal              Final result                 Please view results for these tests on the individual orders.       XR chest 2 views   Final Result   No acute findings on two-view chest.   Signed by Zeyad Burton MD               ED Course & MDM   Diagnoses as of 05/24/25 1811   Chest pain, unspecified type   Secondary hypertension           Medical Decision Making  EKG interpreted by Dr. Robbins. Indication: chest pain. Findings: NSR with a ventricular rate of 78, left axis deviation, normal intervals, and no acute ischemic or injury pattern. Impression: No acute pathology.       Patient was seen and evaluated by Dr. Robbins. Placed on a cardiac monitor which showed normal sinus rhythm without ectopy throughout ED stay. Rhythm strip obtained showed normal sinus rhythm. Impression: No acute pathology. Continuous pulse oximetry monitoring showed no signs of hypoxia. Saline lock was established with labs drawn and results as above. Blood counts. Electrolytes, kidney function, and liver function were unremarkable. Heart Score- 3 with normal EKG and troponin with delta troponin. At this time, we feel that the chest pain is atypical for acute coronary syndrome. We find no underlying evidence of an acute infectious process or pneumothorax on CXR. Clinically, we do not feel they are exhibiting signs of pulmonary embolism or thoracic aortic dissection (no connective tissue disorder, no tachycardia, tachypnea, hypoxia, and mediastinum normal in size on CXR). Given NTG and Tylenol with resolution of his chest pain and improvement in  his headache. Record review shows that he had a heart catheterization on 8/5/24 that showed mild non-obstructive coronary artery disease. Given prescriptions for his Metoprolol and Lisinopril that he has not been taking. Follow up with their doctor in 3 days. Return if worse in any way. Discharged in stable condition with computer instructions.    Diagnostic Impression:     1. Acute atypical chest pain    2. Hypertension    3. Prescription therapy            Your medication list        ASK your doctor about these medications        Instructions Last Dose Given Next Dose Due   clopidogrel 75 mg tablet  Commonly known as: Plavix      Take 1 tablet (75 mg) by mouth once daily.       lisinopril 40 mg tablet      Take 1 tablet (40 mg) by mouth once daily.       metoprolol tartrate 25 mg tablet  Commonly known as: Lopressor      Take 1 tablet (25 mg) by mouth 2 times a day.       rosuvastatin 40 mg tablet  Commonly known as: Crestor      Take 1 tablet (40 mg) by mouth once daily.                  Procedure  Procedures           [1]  Past Medical History:  Diagnosis Date   • Non-ST elevation (NSTEMI) myocardial infarction (Multi)     NSTEMI, initial episode of care   [2]  Past Surgical History:  Procedure Laterality Date   • CARDIAC CATHETERIZATION N/A 8/5/2024    Procedure: Left Heart Cath;  Surgeon: Ambrose Worthy MD;  Location: [facility];  Service: Cardiovascular;  Laterality: N/A;   • OTHER SURGICAL HISTORY  06/25/2021    Percutaneous transluminal coronary angioplasty   [3]  No family history on file.  [4]  Social History  Tobacco Use   • Smoking status: Every Day     Current packs/day: 1.00     Average packs/day: 1 pack/day for 30.0 years (30.0 ttl pk-yrs)     Types: Cigarettes   • Smokeless tobacco: Never   Substance Use Topics   • Alcohol use: Yes     Alcohol/week: 2.0 standard drinks of alcohol     Types: 2 Cans of beer per week   • Drug use: Never      Nikolay Davis, APRN-CNP  05/24/25 1811

## 2025-05-28 LAB
ATRIAL RATE: 79 BPM
P AXIS: 52 DEGREES
PR INTERVAL: 190 MS
Q ONSET: 252 MS
QRS COUNT: 13 BEATS
QRS DURATION: 100 MS
QT INTERVAL: 386 MS
QTC CALCULATION(BAZETT): 440 MS
QTC FREDERICIA: 421 MS
R AXIS: -65 DEGREES
T AXIS: 75 DEGREES
T OFFSET: 445 MS
VENTRICULAR RATE: 78 BPM

## 2025-06-06 ENCOUNTER — APPOINTMENT (OUTPATIENT)
Dept: CARDIOLOGY | Facility: HOSPITAL | Age: 50
End: 2025-06-06

## 2025-06-22 NOTE — PROGRESS NOTES
"Counseling:  The patient was counseled regarding diagnostic results, instructions for management, risk factor reductions, prognosis, patient and family education, impressions, risks and benefits of treatment options and importance of compliance with treatment.      Chief Complaint:   The patient presents today for 6-month followup of CAD, HTN and dyslipidemia.      History Of Present Illness:    Gulshan Martinez is a 49 year old male patient who presents today for 6-month followup of CAD, HTN and dyslipidemia. His PMH is significant for CAD with h/o STEMI s/p POBA to D2 and LOLI to mid LAD 06/24/2021, HTN, hyperlipidemia and tobacco abuse. Over the past 6 months, the patient states that he has done well from a cardiac standpoint. He denies any CP, chest discomfort or SOB. The patient was evaluated in the ED in 05/2025 with chest pain where his blood pressure was found to be elevated. BP has since improved, per the patient, but it does tend to be elevated at appointments. The patient states that other than a baby aspirin, he has discontinued all of his medications and has started taking supplements in their place.          Last Recorded Vitals:  Vitals:    06/23/25 1303 06/23/25 1323   BP: 140/90 140/84   Weight: 136 kg (300 lb)    Height: 1.803 m (5' 11\")        Past Surgical History:  He has a past surgical history that includes Other surgical history (06/25/2021) and Cardiac catheterization (N/A, 8/5/2024).      Social History:  He reports that he has been smoking cigarettes. He has a 30 pack-year smoking history. He has never used smokeless tobacco. He reports current alcohol use of about 2.0 standard drinks of alcohol per week. He reports that he does not use drugs.    Family History:  No family history on file.     Allergies:  Bee venom protein (honey bee)    Outpatient Medications:  Current Outpatient Medications   Medication Instructions    aspirin 81 mg, Daily       Review of Systems   All other systems " reviewed and are negative.     Physical Exam:  Constitutional:       Appearance: Healthy appearance. Not in distress.   Neck:      Vascular: No JVR. JVD normal.   Pulmonary:      Effort: Pulmonary effort is normal.      Breath sounds: Normal breath sounds. No wheezing. No rhonchi. No rales.   Chest:      Chest wall: Not tender to palpatation.   Cardiovascular:      PMI at left midclavicular line. Normal rate. Regular rhythm. Normal S1. Normal S2.       Murmurs: There is no murmur.      No gallop.  No click. No rub.   Pulses:     Intact distal pulses.   Edema:     Peripheral edema absent.   Abdominal:      General: Bowel sounds are normal.      Palpations: Abdomen is soft.      Tenderness: There is no abdominal tenderness.   Musculoskeletal: Normal range of motion.         General: No tenderness. Skin:     General: Skin is warm and dry.   Neurological:      General: No focal deficit present.      Mental Status: Alert and oriented to person, place and time.        Last Labs:  CBC -  Lab Results   Component Value Date    WBC 8.9 05/24/2025    HGB 17.1 05/24/2025    HCT 49.3 05/24/2025    MCV 85 05/24/2025     05/24/2025       CMP -  Lab Results   Component Value Date    CALCIUM 9.3 05/24/2025    PROT 6.7 05/24/2025    ALBUMIN 3.9 05/24/2025    AST 17 05/24/2025    ALT 38 05/24/2025    ALKPHOS 70 05/24/2025    BILITOT 0.4 05/24/2025       LIPID PANEL -   Lab Results   Component Value Date    CHOL 114 06/26/2024    TRIG 92 06/26/2024    HDL 38.5 06/26/2024    CHHDL 3.0 06/26/2024    LDLF 64 03/28/2022    VLDL 18 06/26/2024    NHDL 76 06/26/2024       RENAL FUNCTION PANEL -   Lab Results   Component Value Date    GLUCOSE 82 05/24/2025     05/24/2025    K 3.9 05/24/2025     05/24/2025    CO2 24 05/24/2025    ANIONGAP 12 05/24/2025    BUN 14 05/24/2025    CREATININE 0.89 05/24/2025    GFRMALE >90 03/28/2022    CALCIUM 9.3 05/24/2025    ALBUMIN 3.9 05/24/2025        Last Cardiology Tests:  08/05/2024 -  Cardiac Catheterization (LH)  Mild non-obstructive coronary artery disease.     08/05/2024 - TTE  1. Left ventricular ejection fraction is normal, by visual estimate at 60-65%.  2. There is moderate concentric left ventricular hypertrophy.  3. There is normal right ventricular global systolic function.  4. Aortic valve sclerosis.  5. There is moderate dilatation of the aortic root.    03/28/2022 - TTE  1. The left ventricular systolic function is low normal with a 50-55% estimated ejection fraction.  2. Aortic valve stenosis is not present.     06/24/2021 - TTE  1. The left ventricular systolic function is normal with a 60-65% estimated ejection fraction.  2. Moderately increased left ventricular septal thickness.     06/24/2021 - Cardiac Catheterization (LH) - PCI  1. Double vessel disease.  2. Culprit vessel(s): 2nd diagonal.  3. S/P Successful POBA to D2 with 2.25 mm NC balloon and 3.5 x 32 mm LOLI to mid LAD 70% bifurcational lesion BLU 3 flow and <10% residual stenosis with excellent results.  4. Severely elevated BP.  5. Normal LVEDP.  6. No evidence of AS.     Lab review: I have personally reviewed the laboratory result(s).     Assessment/Plan   1) CAD s/p MI in past and PCI of LAD  On ASA 81 mg daily   Stress and echo not performed - patient does not have insurance   ED evaluation [place] 07/29/2024 with chest pain/pressure/heaviness - was performing exertion at the time, negative ischemic workup.   TTE 08/05/2024 with LVEF 60-65%, moderate concentric LVH, normal RV systolic function, aortic valve sclerosis, moderate dilatation of aortic root.  [facility] 08/05/2024 with mild non-obstructive CAD   ED evaluation 05/2025 with CP - BP spiked, per patient   Denies CP, chest discomfort or SOB  BP stable, per patient - typically elevated at appointments   Other than ASA, patient has discontinued all medications and is starting taking supplements instead   Continue ASA  Followup with Loren Snyder NP, in 6  months    2) Hyperlipidemia  Goal LDL <70  Atorvastatin previously discontinued as patient felt it was causing urinary frequency and possible back pain   Lipid panel 06/26/2024 with LDL of 57; at goal  Other than ASA, patient has discontinued all medications and is starting taking supplements instead   Advised on aggressive secondary risk factor modification now that he is off statin therapy   Check Lipid Panel  Followup with Loren Snyder NP, in 6 months    3) HTN  ED evaluation 05/2025 with CP - BP spiked, per patient   BP stable, per patient - typically elevated at appointments   Other than ASA, patient has discontinued all medications and is starting taking supplements instead   Followup with Loren Snyder NP, in 6 months    4) Smoking  Previously counselled about risks of smoking including worsening of CAD, HTN and Lung disease. D/w patient about options of quitting smoking including Wellbutrin, Nicotine Patch or gum and Chantix. Previously prescribed Chantix.  -Continues to smoke  -Smoking cessation strongly encouraged       Scribe Attestation  By signing my name below, I, Jewell Steinberg, attest that this documentation has been prepared under the direction and in the presence of Ambrose Worthy MD.

## 2025-06-23 ENCOUNTER — OFFICE VISIT (OUTPATIENT)
Dept: CARDIOLOGY | Facility: HOSPITAL | Age: 50
End: 2025-06-23

## 2025-06-23 VITALS
WEIGHT: 300 LBS | BODY MASS INDEX: 42 KG/M2 | SYSTOLIC BLOOD PRESSURE: 140 MMHG | DIASTOLIC BLOOD PRESSURE: 84 MMHG | HEIGHT: 71 IN

## 2025-06-23 DIAGNOSIS — E78.5 HYPERLIPIDEMIA, UNSPECIFIED HYPERLIPIDEMIA TYPE: Chronic | ICD-10-CM

## 2025-06-23 DIAGNOSIS — I25.10 ATHEROSCLEROSIS OF NATIVE CORONARY ARTERY OF NATIVE HEART WITHOUT ANGINA PECTORIS: ICD-10-CM

## 2025-06-23 DIAGNOSIS — I10 BENIGN ESSENTIAL HYPERTENSION: Chronic | ICD-10-CM

## 2025-06-23 PROCEDURE — 99214 OFFICE O/P EST MOD 30 MIN: CPT | Performed by: INTERNAL MEDICINE

## 2025-06-23 PROCEDURE — 3079F DIAST BP 80-89 MM HG: CPT | Performed by: INTERNAL MEDICINE

## 2025-06-23 PROCEDURE — 99212 OFFICE O/P EST SF 10 MIN: CPT | Performed by: INTERNAL MEDICINE

## 2025-06-23 PROCEDURE — 3008F BODY MASS INDEX DOCD: CPT | Performed by: INTERNAL MEDICINE

## 2025-06-23 PROCEDURE — 3077F SYST BP >= 140 MM HG: CPT | Performed by: INTERNAL MEDICINE

## 2025-06-23 RX ORDER — ASPIRIN 81 MG/1
81 TABLET ORAL DAILY
COMMUNITY

## 2025-06-23 ASSESSMENT — ENCOUNTER SYMPTOMS
OCCASIONAL FEELINGS OF UNSTEADINESS: 0
LOSS OF SENSATION IN FEET: 0
DEPRESSION: 0

## 2025-06-23 NOTE — PATIENT INSTRUCTIONS
Continue taking a baby aspirin once daily.  Dr. Worthy has ordered blood work to followup on your cholesterol. You will be notified of the results once they become available.    Watch carbohydrate intake (rice, potatoes, pasta, bread, ice-cream all within moderation); increase greens, veggies, fiber, lean protein (fish, turkey, chicken; baked/boiled/grilled, not fried) and fruit.   Smoking cessation is strongly encouraged.  Followup with Loren Snyder NP, in 6 months.      If you have any questions or cardiac concerns, please call our office at [phone].

## 2025-12-29 ENCOUNTER — APPOINTMENT (OUTPATIENT)
Dept: CARDIOLOGY | Facility: HOSPITAL | Age: 50
End: 2025-12-29

## (undated) DEVICE — GUIDEWIRE, INQUIRE, J TIP, .035 X 210CM, FIXED CORE, DIAGNOSTIC

## (undated) DEVICE — TR BAND, RADIAL COMPRESSION, LONG, 29CM

## (undated) DEVICE — CATHETER, OPTITORQUE, 6FR, JACKY, BL3.5/2H/100CM

## (undated) DEVICE — SHEATH, GLIDESHEATH, SLENDER, 6FR 10CM